# Patient Record
Sex: MALE | Race: WHITE | NOT HISPANIC OR LATINO | Employment: FULL TIME | RURAL
[De-identification: names, ages, dates, MRNs, and addresses within clinical notes are randomized per-mention and may not be internally consistent; named-entity substitution may affect disease eponyms.]

---

## 2017-02-06 ENCOUNTER — OFFICE VISIT (OUTPATIENT)
Dept: FAMILY MEDICINE CLINIC | Facility: CLINIC | Age: 42
End: 2017-02-06

## 2017-02-06 VITALS
HEART RATE: 84 BPM | RESPIRATION RATE: 16 BRPM | TEMPERATURE: 98.8 F | SYSTOLIC BLOOD PRESSURE: 152 MMHG | DIASTOLIC BLOOD PRESSURE: 90 MMHG | WEIGHT: 271 LBS | HEIGHT: 69 IN | BODY MASS INDEX: 40.14 KG/M2

## 2017-02-06 DIAGNOSIS — I10 ESSENTIAL HYPERTENSION: Primary | ICD-10-CM

## 2017-02-06 PROCEDURE — 99213 OFFICE O/P EST LOW 20 MIN: CPT | Performed by: NURSE PRACTITIONER

## 2017-02-06 RX ORDER — LOSARTAN POTASSIUM 100 MG/1
100 TABLET ORAL DAILY
Qty: 30 TABLET | Refills: 5 | Status: SHIPPED | OUTPATIENT
Start: 2017-02-06

## 2017-02-06 RX ORDER — LOSARTAN POTASSIUM 100 MG/1
100 TABLET ORAL DAILY
COMMUNITY
End: 2017-02-06 | Stop reason: SDUPTHER

## 2017-02-06 RX ORDER — AMLODIPINE BESYLATE 10 MG/1
10 TABLET ORAL DAILY
Qty: 30 TABLET | Refills: 5 | Status: SHIPPED | OUTPATIENT
Start: 2017-02-06 | End: 2017-07-06 | Stop reason: SDUPTHER

## 2017-02-06 NOTE — PROGRESS NOTES
Subjective   Alexis Mariee is a 41 y.o. male.     Hypertension   This is a chronic problem. The current episode started more than 1 year ago. The problem has been waxing and waning (b/p is running about 150/90 at home.) since onset. The problem is uncontrolled. Pertinent negatives include no anxiety, blurred vision, chest pain, headaches, malaise/fatigue, neck pain, orthopnea, palpitations, peripheral edema, PND, shortness of breath or sweats. Associated agents: does take caffiene pills and testosterone. Risk factors for coronary artery disease include male gender. Past treatments include ACE inhibitors. The current treatment provides moderate improvement. There are no compliance problems.  There is no history of angina, kidney disease, CAD/MI, CVA, heart failure, left ventricular hypertrophy, PVD or retinopathy.        The following portions of the patient's history were reviewed and updated as appropriate: allergies, current medications, past family history, past medical history, past social history, past surgical history and problem list.    Review of Systems   Constitutional: Negative.  Negative for malaise/fatigue.   HENT: Negative.    Eyes: Negative for blurred vision.   Respiratory: Negative.  Negative for shortness of breath.    Cardiovascular: Negative.  Negative for chest pain, palpitations, orthopnea and PND.   Musculoskeletal: Negative.  Negative for neck pain.   Skin: Negative.    Neurological: Negative.  Negative for headaches.   Psychiatric/Behavioral: Negative.        Objective   Physical Exam   Constitutional: He is oriented to person, place, and time. He appears well-developed and well-nourished. No distress.   HENT:   Head: Normocephalic.   Eyes: EOM are normal. Pupils are equal, round, and reactive to light.   Neck: Normal range of motion. Neck supple. No thyromegaly present.   Cardiovascular: Normal rate, regular rhythm and normal heart sounds.  Exam reveals no friction rub.    No murmur  heard.  Pulmonary/Chest: Effort normal and breath sounds normal. No respiratory distress. He has no wheezes. He has no rales.   Abdominal: Soft.   Musculoskeletal: Normal range of motion.   Neurological: He is alert and oriented to person, place, and time.   Skin: Skin is warm and dry.   Psychiatric: He has a normal mood and affect. Thought content normal.   Nursing note and vitals reviewed.      Assessment/Plan   Alexis was seen today for establish care.    Diagnoses and all orders for this visit:    Essential hypertension  -     amLODIPine (NORVASC) 10 MG tablet; Take 1 tablet by mouth Daily.  -     losartan (COZAAR) 100 MG tablet; Take 1 tablet by mouth Daily.  -     Comprehensive Metabolic Panel; Future  -     Lipid Panel; Future    he will cont with the losartan, but amlodipine is added.  He will cont to check his b/p at home.  Will have his labs drawn at an outside lab and will provide me with results.  Michael in 1 month.

## 2017-07-06 DIAGNOSIS — I10 ESSENTIAL HYPERTENSION: ICD-10-CM

## 2017-07-06 RX ORDER — AMLODIPINE BESYLATE 10 MG/1
TABLET ORAL
Qty: 30 TABLET | Refills: 0 | Status: SHIPPED | OUTPATIENT
Start: 2017-07-06

## 2019-03-05 ENCOUNTER — OFFICE VISIT (OUTPATIENT)
Dept: ORTHOPEDIC SURGERY | Facility: CLINIC | Age: 44
End: 2019-03-05

## 2019-03-05 VITALS — BODY MASS INDEX: 40.88 KG/M2 | WEIGHT: 276 LBS | HEIGHT: 69 IN

## 2019-03-05 DIAGNOSIS — M25.561 ACUTE PAIN OF RIGHT KNEE: Primary | ICD-10-CM

## 2019-03-05 DIAGNOSIS — S76.111A QUADRICEPS MUSCLE RUPTURE, RIGHT, INITIAL ENCOUNTER: ICD-10-CM

## 2019-03-05 PROCEDURE — 99203 OFFICE O/P NEW LOW 30 MIN: CPT | Performed by: ORTHOPAEDIC SURGERY

## 2019-03-05 RX ORDER — HYDROCODONE BITARTRATE AND ACETAMINOPHEN 7.5; 325 MG/1; MG/1
TABLET ORAL
Qty: 30 TABLET | Refills: 0 | Status: SHIPPED | OUTPATIENT
Start: 2019-03-05

## 2019-03-08 DIAGNOSIS — M25.561 ACUTE PAIN OF RIGHT KNEE: Primary | ICD-10-CM

## 2019-03-08 DIAGNOSIS — S76.111A QUADRICEPS MUSCLE RUPTURE, RIGHT, INITIAL ENCOUNTER: ICD-10-CM

## 2019-03-13 DIAGNOSIS — M25.561 ACUTE PAIN OF RIGHT KNEE: ICD-10-CM

## 2019-03-13 DIAGNOSIS — S76.111A QUADRICEPS MUSCLE RUPTURE, RIGHT, INITIAL ENCOUNTER: ICD-10-CM

## 2019-04-01 ENCOUNTER — HOSPITAL ENCOUNTER (OUTPATIENT)
Dept: PHYSICAL THERAPY | Facility: HOSPITAL | Age: 44
Setting detail: THERAPIES SERIES
Discharge: HOME | End: 2019-04-01

## 2019-04-01 ENCOUNTER — TRANSCRIBE ORDERS (OUTPATIENT)
Dept: PHYSICAL THERAPY | Facility: HOSPITAL | Age: 44
End: 2019-04-01

## 2019-04-01 DIAGNOSIS — S76.191D OTHER SPECIFIED INJURY OF RIGHT QUADRICEPS MUSCLE, FASCIA AND TENDON, SUBSEQUENT ENCOUNTER: Primary | ICD-10-CM

## 2019-04-01 DIAGNOSIS — M25.561 ACUTE PAIN OF RIGHT KNEE: ICD-10-CM

## 2019-04-01 PROCEDURE — 97110 THERAPEUTIC EXERCISES: CPT | Performed by: PHYSICAL THERAPIST

## 2019-04-01 PROCEDURE — 97161 PT EVAL LOW COMPLEX 20 MIN: CPT | Performed by: PHYSICAL THERAPIST

## 2019-04-01 NOTE — THERAPY EVALUATION
Outpatient Physical Therapy Ortho Initial Evaluation  [facility]  Shawna Hernández, PT, DPT, CSCS       Patient Name: Alexis Mariee  : 1975  MRN: [id number]  Today's Date: 2019      Visit Date: 2019     Pt reports 0/10 pain pre treatment, 0/10 pain post treatment  Reports N/A% of improvement.  Attended  visits.  Insurance available: medical necessity, 1 hour/day max  Next MD appt: ~3 weeks .  Recertification: 2019.    Patient Active Problem List   Diagnosis   • Essential hypertension   • Acute pain of right knee        Past Medical History:   Diagnosis Date   • Cellulitis and abscess of leg    • Cellulitis and abscess of trunk    • Cellulitis of trunk    • Deficiency of testosterone biosynthesis    • Encounter for general health examination    • Essential hypertension    • General medical exam    • Pain in lower limb    • Pain in upper limb         Past Surgical History:   Procedure Laterality Date   • BACK SURGERY  2017   • HIP SURGERY      fx left hip socket   • INJECTION OF MEDICATION  2012    Celestone (betamethasone)    • INJECTION OF MEDICATION  2012    Rocephin X 2   • ORIF FOREARM FRACTURE Left    • QUADRICEPS TENDON REPAIR Right 03/10/2019       Visit Dx:     ICD-10-CM ICD-9-CM   1. Other specified injury of right quadriceps muscle, fascia and tendon, subsequent encounter S76.191D 959.6     Number of days off work: Off work since February    Patient is .    Patient has 2 children.    Allergies       PenicillinsHives    Reviewed by You at 3:26 PM CDT     Medications      amLODIPine (NORVASC) 10 MG tablet     HYDROcodone-acetaminophen (NORCO) 7.5-325 MG per tablet     losartan (COZAAR) 100 MG tablet     Testosterone Cypionate 200 MG/ML kit          Patient History     Row Name 19 1500             History    Chief Complaint  Pain;Muscle weakness  -AJ      Type of Pain  Lower Extremity / Leg  -AJ      Date Current  Problem(s) Began  03/02/19  -AJ      Brief Description of Current Complaint  Patient reports he was in the garage and slipped on a yoga mat and tore his quad tendon.  -AJ      Previous treatment for THIS PROBLEM  Surgery  -AJ      Surgery Date:  03/12/19  -AJ      Patient/Caregiver Goals  Relieve pain;Return to prior level of function;Improve strength  -AJ      Current Tobacco Use  None  -AJ      Smoking Status  Non-smoker  -AJ      Patient's Rating of General Health  Good  -AJ      Occupation/sports/leisure activities  Occupation: [employer]; hobbies: [employer], weight lifting, off roading, cruising  -AJ      Patient seeing anyone else for problem(s)?  Yes, Ortho  -AJ      What clinical tests have you had for this problem?  MRI  -AJ      Results of Clinical Tests  Torn quad tendon  -AJ      History of Previous Related Injuries  None prior to this.  -AJ         Pain     Pain Location  Leg  -AJ      Pain at Present  0  -AJ      Pain at Best  0  -AJ      Pain at Worst  10  -AJ      Pain Frequency  Intermittent  -AJ      Pain Description  Dull;Aching;Sore  -AJ      Is your sleep disturbed?  Yes trouble getting to sleep  -AJ      Is medication used to assist with sleep?  Yes  -AJ      Difficulties at work?  Off work since february  -AJ      Difficulties with ADL's?  dressing, walking, transfers  -AJ      Difficulties with recreational activities?  all listed above  -AJ        User Key  (r) = Recorded By, (t) = Taken By, (c) = Cosigned By    Initials Name Provider Type    AJ Shawna Hernández, PT Physical Therapist          PT Ortho     Row Name 04/01/19 1500       Subjective Comments    Subjective Comments  Patient wishes to get back to weight lifting.  -AJ       Precautions and Contraindications    Contraindications  No active extension of R knee  (Significant)   -AJ       Subjective Pain    Able to rate subjective pain?  yes  -AJ    Pre-Treatment Pain Level  0  -AJ    Post-Treatment Pain Level  0  -AJ        Posture/Observations    Posture- WNL  Posture is WNL B LEs  -AJ    Observations  Incision healing;Muscle atrophy;Edema  -AJ    Posture/Observations Comments  Comes in WBAT earing locked hinged knee brace with ace wrap around leg. No disterss.  -AJ       Special Tests/Palpation    Special Tests/Palpation  -- No areas of TTP.  -AJ       Right Lower Ext    Rt Knee Extension/Flexion AROM  12°-65°  -AJ       Left Lower Ext    Lt Knee Extension/Flexion AROM  0°-127°  -AJ       MMT (Manual Muscle Testing)    General MMT Comments  Unable to SLR.  -AJ       Sensation    Sensation WNL?  WFL  -AJ    Light Touch  No apparent deficits  -AJ    Additional Comments  Denies any tingling.  -AJ       Lower Extremity Flexibility    Quadriceps  Right:;Severely limited  -AJ       RLE Quick Girth (cm)    Other 1  40 cm knee joint line  -AJ    Other 2  47.4 cm suprapatellar  -AJ       LLE Quick Girth (cm)    Other 1  40 cm knee joint line  -AJ    Other 2  51 cm suprapatellar  -AJ       Transfers    Comment (Transfers)  I with all transfers with brace and use of UEs to A R LE.  -AJ       Gait/Stairs Assessment/Training    Deviations/Abnormal Patterns (Gait)  right sided deviations;antalgic;base of support, wide  -AJ    Comment (Gait/Stairs)  Ambulating with hinged knee brace on R LE, locked at 0°, wider SOLEDAD to accomidate locked knee brace.  -AJ      User Key  (r) = Recorded By, (t) = Taken By, (c) = Cosigned By    Initials Name Provider Type    AJ Shawna Hernández, PT Physical Therapist              Therapy Education  Given: HEP, Symptoms/condition management, Pain management, Fall prevention and home safety, Mobility training, Edema management, Bandaging/dressing change(POC)  Program: New  How Provided: Verbal, Demonstration, Written  Provided to: Patient  Level of Understanding: Verbalized, Demonstrated     PT OP Goals     Row Name 04/01/19 1500          PT Short Term Goals    STG Date to Achieve  04/22/19  -AJ     STG 1  I with  HEP and have additions/changes by next recertification.  -     STG 2  AROM R knee 5° from full extension.  -     STG 3  AROM R knee flexion >= 90° of flexion.  -     STG 4  Patient able to perform 20 SLR with brace with no signficant lag present.  -     STG 5  Patient able to verbalize and abide by MD restrictions of no active extension and brace wear.  -        Long Term Goals    LTG Date to Achieve  06/14/19  -     LTG 1  AROM R knee 0°.  -     LTG 2  AROM R knee flexion >= 125°.  -     LTG 3  B LE 5/5.  -     LTG 4  Circum measurements suprapatellar within 0.5cm of R=L.  -     LTG 5  Patient able to ambulate FWB, non-antalgic, 1/4 mile with no increase in pain.  -     LTG 6  Patient able to ambulate up/down 3 steps reciprocally x10 with no increase in pain.  -     LTG 7  Sit to/from stand x20, no UE A with = WB B LEs.  -     LTG 8  I with final HEP.  -     LTG 9  D/C with final HEP and free 30 day fitness formula [employer].  -        Time Calculation    PT Goal Re-Cert Due Date  04/22/19  -       User Key  (r) = Recorded By, (t) = Taken By, (c) = Cosigned By    Initials Name Provider Type    Shawna Mahan, PT Physical Therapist         Barriers to Rehab: None noted.    Safety Issues: None noted.      PT Assessment/Plan     Row Name 04/01/19 1500          PT Assessment    Functional Limitations  Impaired gait;Limitation in home management;Limitations in community activities;Performance in leisure activities;Performance in self-care ADL;Performance in work activities  -     Impairments  Balance;Endurance;Edema;Gait;Impaired muscle endurance;Impaired muscle length;Impaired muscle power;Joint mobility;Motor function;Muscle strength;Pain;Range of motion  -     Assessment Comments  Patient unable to SLR. Discussed using brace locked for ambulation and sleeping. Did well with HEP and given written copies.  -     Rehab Potential  Good  -     Patient/caregiver  "participated in establishment of treatment plan and goals  Yes  -AJ     Patient would benefit from skilled therapy intervention  Yes  -AJ        PT Plan    PT Frequency  2x/week  -AJ     Predicted Duration of Therapy Intervention (Therapy Eval)  8-10 weeks  -AJ     Planned CPT's?  PT EVAL LOW COMPLEXITY: 95395;PT RE-EVAL: 44762;PT THER PROC EA 15 MIN: 93660;PT THER ACT EA 15 MIN: 77872;PT MANUAL THERAPY EA 15 MIN: 66528;PT GAIT TRAINING EA 15 MIN: 61175;PT ELECTRICAL STIM UNATTEND: ;PT THER SUPP EA 15 MIN  -AJ     Physical Therapy Interventions (Optional Details)  balance training;gait training;gross motor skills;home exercise program;manual therapy techniques;modalities;patient/family education;ROM (Range of Motion);stair training;strengthening;stretching;transfer training  -AJ     PT Plan Comments  Add heel prop next session.  -AJ       User Key  (r) = Recorded By, (t) = Taken By, (c) = Cosigned By    Initials Name Provider Type    Shawna Mahan, PT Physical Therapist       Other therapeutic activities and/or exercises will be prescribed depending on the patients progress or lack there of.      Exercises     Row Name 04/01/19 1500             Subjective Comments    Subjective Comments  Patient wishes to get back to weight lifting.  -AJ         Subjective Pain    Able to rate subjective pain?  yes  -AJ      Pre-Treatment Pain Level  0  -AJ      Post-Treatment Pain Level  0  -AJ         Exercise 1    Exercise Name 1  Quad sets with towel roll  -AJ      Reps 1  20  -AJ      Time 1  5\" hold  -AJ      Additional Comments  submax  -AJ         Exercise 2    Exercise Name 2  Heel slides with strap  -AJ      Cueing 2  Verbal;Demo  -AJ      Reps 2  20  -AJ      Time 2  5\" holds EOR  -AJ      Additional Comments  PROM for extension  -AJ         Exercise 3    Exercise Name 3  SL hip abduction with brace on  -AJ      Sets 3  2  -AJ      Reps 3  10  -AJ      Time 3  5\" hold  -AJ        User Key  (r) = Recorded " By, (t) = Taken By, (c) = Cosigned By    Initials Name Provider Type    Shawna Mahan, PT Physical Therapist                        Outcome Measure Options: Lower Extremity Functional Scale (LEFS)  Lower Extremity Functional Index  Any of your usual work, housework or school activities: Extreme difficulty or unable to perform activity  Your usual hobbies, recreational or sporting activities: Extreme difficulty or unable to perform activity  Getting into or out of the bath: Moderate difficulty  Walking between rooms: Moderate difficulty  Putting on your shoes or socks: Extreme difficulty or unable to perform activity  Squatting: Extreme difficulty or unable to perform activity  Lifting an object, like a bag of groceries from the floor: Extreme difficulty or unable to perform activity  Performing light activities around your home: A little bit of difficulty  Performing heavy activities around your home: Extreme difficulty or unable to perform activity  Getting into or out of a car: Extreme difficulty or unable to perform activity  Walking 2 blocks: Extreme difficulty or unable to perform activity  Walking a mile: Extreme difficulty or unable to perform activity  Going up or down 10 stairs (about 1 flight of stairs): Extreme difficulty or unable to perform activity  Standing for 1 hour: Extreme difficulty or unable to perform activity  Sitting for 1 hour: Extreme difficulty or unable to perform activity  Running on even ground: Extreme difficulty or unable to perform activity  Running on uneven ground: Extreme difficulty or unable to perform activity  Making sharp turns while running fast: Extreme difficulty or unable to perform activity  Hopping: Extreme difficulty or unable to perform activity  Rolling over in bed: A little bit of difficulty  Total: 10      Time Calculation:     Start Time: 1515  Stop Time: 1601  Time Calculation (min): 46 min  Total Timed Code Minutes- PT: 23 minute(s)     Therapy Charges  for Today     Code Description Service Date Service Provider Modifiers Qty    57352373114 HC PT EVAL LOW COMPLEXITY 2 4/1/2019 Shawna Hernández, PT GP 1    86814688760 HC PT THER PROC EA 15 MIN 4/1/2019 Shawna Hernández, PT GP 2    79263470879 HC PT THER SUPP EA 15 MIN 4/1/2019 Shawna Hernández, PT GP 1          PT G-Codes  Outcome Measure Options: Lower Extremity Functional Scale (LEFS)  Total: 10         Shawna Hernández, PT, DPT, CSCS  4/1/2019

## 2019-04-03 ENCOUNTER — HOSPITAL ENCOUNTER (OUTPATIENT)
Dept: PHYSICAL THERAPY | Facility: HOSPITAL | Age: 44
Setting detail: THERAPIES SERIES
Discharge: HOME | End: 2019-04-03

## 2019-04-03 DIAGNOSIS — M25.561 ACUTE PAIN OF RIGHT KNEE: ICD-10-CM

## 2019-04-03 DIAGNOSIS — S76.191D OTHER SPECIFIED INJURY OF RIGHT QUADRICEPS MUSCLE, FASCIA AND TENDON, SUBSEQUENT ENCOUNTER: Primary | ICD-10-CM

## 2019-04-03 PROCEDURE — 97110 THERAPEUTIC EXERCISES: CPT

## 2019-04-03 NOTE — THERAPY TREATMENT NOTE
Outpatient Physical Therapy Ortho Treatment Note  [facility]  Emilee Regan PTA       Patient Name: Alexis Mariee  : 1975  MRN: [id number]  Today's Date: 4/3/2019      Visit Date: 2019     Visits: 2/2   Insurance Visits Approved: no precert, insurance limitations of 1 hour/day max  Recert Due: 2019  MD Appt: 3 weeks  Pain: pretreatment 0/10; post treatment 0/10  Improvement: pt is subjectively reporting 0% improvement since initial evaluation    Visit Dx:    ICD-10-CM ICD-9-CM   1. Other specified injury of right quadriceps muscle, fascia and tendon, subsequent encounter S76.191D 959.6   2. Acute pain of right knee M25.561 719.46       Patient Active Problem List   Diagnosis   • Essential hypertension   • Acute pain of right knee        Past Medical History:   Diagnosis Date   • Cellulitis and abscess of leg    • Cellulitis and abscess of trunk    • Cellulitis of trunk    • Deficiency of testosterone biosynthesis    • Encounter for general health examination    • Essential hypertension    • General medical exam    • Pain in lower limb    • Pain in upper limb         Past Surgical History:   Procedure Laterality Date   • BACK SURGERY  2017   • HIP SURGERY      fx left hip socket   • INJECTION OF MEDICATION  2012    Celestone (betamethasone)    • INJECTION OF MEDICATION  2012    Rocephin X 2   • ORIF FOREARM FRACTURE Left    • QUADRICEPS TENDON REPAIR Right 03/10/2019       PT Ortho     Row Name 19 1500       Subjective Comments    Subjective Comments  Patient wishes to get back to weight lifting.  -AJ       Precautions and Contraindications    Contraindications  No active extension of R knee  (Significant)   -AJ       Subjective Pain    Able to rate subjective pain?  yes  -AJ    Pre-Treatment Pain Level  0  -AJ    Post-Treatment Pain Level  0  -AJ       Posture/Observations    Posture- WNL  Posture is WNL B LEs  -AJ    Observations   Incision healing;Muscle atrophy;Edema  -    Posture/Observations Comments  Comes in WBAT earing locked hinged knee brace with ace wrap around leg. No disterss.  -AJ       Special Tests/Palpation    Special Tests/Palpation  -- No areas of TTP.  -AJ       Right Lower Ext    Rt Knee Extension/Flexion AROM  12°-65°  -AJ       Left Lower Ext    Lt Knee Extension/Flexion AROM  0°-127°  -       MMT (Manual Muscle Testing)    General MMT Comments  Unable to SLR.  -AJ       Sensation    Sensation WNL?  WFL  -AJ    Light Touch  No apparent deficits  -AJ    Additional Comments  Denies any tingling.  -       Lower Extremity Flexibility    Quadriceps  Right:;Severely limited  -AJ       RLE Quick Girth (cm)    Other 1  40 cm knee joint line  -AJ    Other 2  47.4 cm suprapatellar  -AJ       LLE Quick Girth (cm)    Other 1  40 cm knee joint line  -AJ    Other 2  51 cm suprapatellar  -AJ       Transfers    Comment (Transfers)  I with all transfers with brace and use of UEs to A R LE.  -       Gait/Stairs Assessment/Training    Deviations/Abnormal Patterns (Gait)  right sided deviations;antalgic;base of support, wide  -AJ    Comment (Gait/Stairs)  Ambulating with hinged knee brace on R LE, locked at 0°, wider SOLEDAD to accomidate locked knee brace.  -      User Key  (r) = Recorded By, (t) = Taken By, (c) = Cosigned By    Initials Name Provider Type     Shawna Hernández PT Physical Therapist                      PT Assessment/Plan     Row Name 04/03/19 1500          PT Assessment    Assessment Comments  patient able to demonstrate a SLR at end of treatment today with max effort. good effort with all of treatment. and gives good attention to restrictions  -        PT Plan    PT Frequency  2x/week  -     PT Plan Comments  next visit continue working on ROM and reassess knee extension.   -       User Key  (r) = Recorded By, (t) = Taken By, (c) = Cosigned By    Initials Name Provider Type     Emilee Regan PTA  Physical Therapy Assistant          Modalities     Row Name 04/03/19 1500             Ice    Patient reports will apply ice at home to involved area  Yes  -MH        User Key  (r) = Recorded By, (t) = Taken By, (c) = Cosigned By    Initials Name Provider Type    Emilee Mujica PTA Physical Therapy Assistant        Exercises     Row Name 04/03/19 1500             Subjective Comments    Subjective Comments  doing fine today. has questions about how far he can bend his knee   -MH         Subjective Pain    Able to rate subjective pain?  yes  -MH      Pre-Treatment Pain Level  0  -MH      Post-Treatment Pain Level  0  -MH         Exercise 1    Exercise Name 1  Heel Slides with Strap  -MH      Time 1  5 minutes for 5 sec hold each rep  -MH      Additional Comments  PROM for extension  -MH         Exercise 2    Exercise Name 2  Quad Sets with Towel Roll   -MH      Reps 2  20  -MH      Time 2  5 sec hold  -MH         Exercise 3    Exercise Name 3  SLR Fwd Flex clinician Assist  -MH      Reps 3  20  -MH      Additional Comments  5 sec hold  -MH         Exercise 4    Exercise Name 4  Heel Prop   -MH      Time 4  5 minutes  -MH         Exercise 5    Exercise Name 5  SLR Hip ADD & ABD  -MH      Reps 5  20  -MH         Exercise 6    Exercise Name 6  prone hip ext  -MH      Reps 6  20  -MH         Exercise 7    Exercise Name 7  PEAFs  -MH      Reps 7  20  -MH      Time 7  5 sec hold  -MH         Exercise 8    Exercise Name 8  SLR no brace/no assist  -MH      Sets 8  4  -MH      Reps 8  5  -MH         Exercise 9    Exercise Name 9  Isometric [place]  -MH      Reps 9  20  -MH      Time 9  5 sec hold  -MH        User Key  (r) = Recorded By, (t) = Taken By, (c) = Cosigned By    Initials Name Provider Type    Emilee Mujica PTA Physical Therapy Assistant                       PT OP Goals     Row Name 04/03/19 1500          PT Short Term Goals    STG Date to Achieve  04/22/19  -MH     STG 1  I with HEP and have  additions/changes by next recertification.  -     STG 1 Progress  Met  -     STG 2  AROM R knee 5° from full extension.  -     STG 2 Progress  Ongoing  -     STG 3  AROM R knee flexion >= 90° of flexion.  -     STG 3 Progress  Ongoing  -     STG 4  Patient able to perform 20 SLR with brace with no signficant lag present.  -     STG 4 Progress  Progressing  -     STG 5  Patient able to verbalize and abide by MD restrictions of no active extension and brace wear.  -     STG 5 Progress  Met;Ongoing  [facility]        Long Term Goals    LTG Date to Achieve  06/14/19  -     LTG 1  AROM R knee 0°.  -     LTG 1 Progress  Ongoing  -     LTG 2  AROM R knee flexion >= 125°.  -     LTG 2 Progress  Ongoing  -     LTG 3  B LE 5/5.  -     LTG 3 Progress  Ongoing  -     LTG 4  Circum measurements suprapatellar within 0.5cm of R=L.  -     LTG 4 Progress  Ongoing  -     LTG 5  Patient able to ambulate FWB, non-antalgic, 1/4 mile with no increase in pain.  -     LTG 5 Progress  Ongoing  -     LTG 6  Patient able to ambulate up/down 3 steps reciprocally x10 with no increase in pain.  -     LTG 6 Progress  Ongoing  -     LTG 7  Sit to/from stand x20, no UE A with = WB B LEs.  -     LTG 7 Progress  Ongoing  [facility]     LTG 8  I with final HEP.  -     LTG 8 Progress  Ongoing  [facility]     LTG 9  D/C with final HEP and free [facility].  -     LTG 9 Progress  Ongoing  [facility]        Time Calculation    PT Goal Re-Cert Due Date  04/22/19  -       User Key  (r) = Recorded By, (t) = Taken By, (c) = Cosigned By    Initials Name Provider Type    Emilee Mujica PTA Physical Therapy Assistant          Therapy Education  Education Details: SLR 4 way, PEAFS, Heel Prop  Given: HEP, Symptoms/condition management, Pain management  Program: New, Reinforced  How Provided: Verbal, Demonstration, Written  Provided to: Patient  Level of Understanding: Teach back education performed, Verbalized,  Demonstrated              Time Calculation:   Start Time: 1520  Stop Time: 1635  Time Calculation (min): 75 min  PT Non-Billable Time (min): 15 min  Total Timed Code Minutes- PT: 60 minute(s)  Therapy Charges for Today     Code Description Service Date Service Provider Modifiers Qty    44044906905 HC PT THER PROC EA 15 MIN 4/3/2019 Emilee Regan PTA GP 4    26367422753 HC PT THER SUPP EA 15 MIN 4/3/2019 Emilee Regan PTA GP 2                    Emilee Regan PTA  4/3/2019

## 2019-04-09 ENCOUNTER — HOSPITAL ENCOUNTER (OUTPATIENT)
Dept: PHYSICAL THERAPY | Facility: HOSPITAL | Age: 44
Setting detail: THERAPIES SERIES
Discharge: HOME | End: 2019-04-09

## 2019-04-09 DIAGNOSIS — M25.561 ACUTE PAIN OF RIGHT KNEE: ICD-10-CM

## 2019-04-09 DIAGNOSIS — S76.191D OTHER SPECIFIED INJURY OF RIGHT QUADRICEPS MUSCLE, FASCIA AND TENDON, SUBSEQUENT ENCOUNTER: Primary | ICD-10-CM

## 2019-04-09 PROCEDURE — 97110 THERAPEUTIC EXERCISES: CPT

## 2019-04-09 NOTE — THERAPY TREATMENT NOTE
Outpatient Physical Therapy Ortho Treatment Note  [facility]  Emilee Regan PTA       Patient Name: Alexis Mariee  : 1975  MRN: [id number]  Today's Date: 2019      Visit Date: 2019     Visits: 3/3  Insurance Visits Approved: insurance limitations of 1 hour per day  Recert Due: 2019  MD Appt: 3 weeks  Pain: pretreatment 0/10; post treatment 0/10  Improvement: pt is subjectively reporting 0% improvement since initial evaluation    Visit Dx:    ICD-10-CM ICD-9-CM   1. Other specified injury of right quadriceps muscle, fascia and tendon, subsequent encounter S76.191D 959.6   2. Acute pain of right knee M25.561 719.46       Patient Active Problem List   Diagnosis   • Essential hypertension   • Acute pain of right knee        Past Medical History:   Diagnosis Date   • Cellulitis and abscess of leg    • Cellulitis and abscess of trunk    • Cellulitis of trunk    • Deficiency of testosterone biosynthesis    • Encounter for general health examination    • Essential hypertension    • General medical exam    • Pain in lower limb    • Pain in upper limb         Past Surgical History:   Procedure Laterality Date   • BACK SURGERY  2017   • HIP SURGERY      fx left hip socket   • INJECTION OF MEDICATION  2012    Celestone (betamethasone)    • INJECTION OF MEDICATION  2012    Rocephin X 2   • ORIF FOREARM FRACTURE Left    • QUADRICEPS TENDON REPAIR Right 03/10/2019       PT Ortho     Row Name 19 1300       Subjective Comments    Subjective Comments  denies pain. doing well. wonders when he will be allowed to do more.   -       Precautions and Contraindications    Contraindications  No active extension of R knee  (Significant)   -       Subjective Pain    Able to rate subjective pain?  yes  -    Pre-Treatment Pain Level  0  -MH    Post-Treatment Pain Level  0  -MH       Right Lower Ext    Rt Knee Extension/Flexion AROM  0°-82°  -      User Key   (r) = Recorded By, (t) = Taken By, (c) = Cosigned By    Initials Name Provider Type     Emilee Regan PTA Physical Therapy Assistant                      PT Assessment/Plan     Row Name 04/09/19 1300          PT Assessment    Assessment Comments  able to SLR but has poor endurance and only able to do sets of 5 before resting. does have minimal lag with SLR. has improved ROM of knee at this time  -        PT Plan    PT Frequency  2x/week  -     PT Plan Comments  next visit continue working on PEAFS and improving SLR  -       User Key  (r) = Recorded By, (t) = Taken By, (c) = Cosigned By    Initials Name Provider Type     Emilee Regan PTA Physical Therapy Assistant          Modalities     Row Name 04/09/19 1300             Ice    Ice Applied  Yes  -      Location  right knee  -      Rx Minutes  15 mins  -      Ice S/P Rx  Yes  -        User Key  (r) = Recorded By, (t) = Taken By, (c) = Cosigned By    Initials Name Provider Type     Emilee Regan PTA Physical Therapy Assistant        Exercises     Row Name 04/09/19 1300             Subjective Comments    Subjective Comments  denies pain. doing well. wonders when he will be allowed to do more.   -         Subjective Pain    Able to rate subjective pain?  yes  -      Pre-Treatment Pain Level  0  -      Post-Treatment Pain Level  0  -         Exercise 1    Exercise Name 1  Pro II backwards only  -      Time 1  10 minutes  -      Additional Comments  L 1.0  -         Exercise 2    Exercise Name 2  SLR Fwd Flexion  -      Sets 2  4  -      Reps 2  5  -      Additional Comments  no hold  -         Exercise 3    Exercise Name 3  Heel slide with strap   -      Time 3  5 minutes for 5 sec hold  -      Additional Comments  PROM for extension  -         Exercise 4    Exercise Name 4  quad sets  -      Time 4  5 minutes, 5 sec hold  -         Exercise 5    Exercise Name 5  Heel Prop  -      Time 5  5 minutes  -          Exercise 6    Exercise Name 6  SLR Hip ADD, Hip ABD  -      Reps 6  20  -         Exercise 7    Exercise Name 7  PEAF's  -      Reps 7  10  -        User Key  (r) = Recorded By, (t) = Taken By, (c) = Cosigned By    Initials Name Provider Type    Emilee Mujica, PTA Physical Therapy Assistant                       PT OP Goals     Row Name 04/09/19 1300          PT Short Term Goals    STG Date to Achieve  04/22/19  -     STG 1  I with HEP and have additions/changes by next recertification.  -     STG 1 Progress  Met  -     STG 2  AROM R knee 5° from full extension.  -     STG 2 Progress  Met  -     STG 3  AROM R knee flexion >= 90° of flexion.  -     STG 3 Progress  Ongoing  -     STG 4  Patient able to perform 20 SLR with brace with no signficant lag present.  -     STG 4 Progress  Progressing  -     STG 5  Patient able to verbalize and abide by MD restrictions of no active extension and brace wear.  -     STG 5 Progress  Met;Ongoing  -        Long Term Goals    LTG Date to Achieve  06/14/19  -     LTG 1  AROM R knee 0°.  -     LTG 1 Progress  Met  -     LTG 2  AROM R knee flexion >= 125°.  -     LTG 2 Progress  Ongoing  -     LTG 3  B LE 5/5.  -     LTG 3 Progress  Ongoing  -     LTG 4  Circum measurements suprapatellar within 0.5cm of R=L.  -     LTG 4 Progress  Ongoing  -     LTG 5  Patient able to ambulate FWB, non-antalgic, 1/4 mile with no increase in pain.  -     LTG 5 Progress  Ongoing  -     LTG 6  Patient able to ambulate up/down 3 steps reciprocally x10 with no increase in pain.  -     LTG 6 Progress  Ongoing  -     LTG 7  Sit to/from stand x20, no UE A with = WB B LEs.  -     LTG 7 Progress  Ongoing  -     LTG 8  I with final HEP.  -     LTG 8 Progress  Ongoing  [facility] [place]     LTG 9  D/C with final HEP and free 30 day fitness formula memembership.  -     LTG 9 Progress  Ongoing  -        Time Calculation    PT Goal Re-Cert Due Date  04/22/19   -       User Key  (r) = Recorded By, (t) = Taken By, (c) = Cosigned By    Initials Name Provider Type     Emilee Regan PTA Physical Therapy Assistant                         Time Calculation:   Start Time: 1300  Stop Time: 1405  Time Calculation (min): 65 min  PT Non-Billable Time (min): 15 min  Total Timed Code Minutes- PT: 50 minute(s)  Therapy Charges for Today     Code Description Service Date Service Provider Modifiers Qty    16258958629 HC PT THER PROC EA 15 MIN 4/9/2019 Emilee Regan PTA GP 3    77322132556 HC PT THER SUPP EA 15 MIN 4/9/2019 Emilee Regan PTA GP 1                    Emilee Regan PTA  4/9/2019

## 2019-04-11 ENCOUNTER — HOSPITAL ENCOUNTER (OUTPATIENT)
Dept: PHYSICAL THERAPY | Facility: HOSPITAL | Age: 44
Setting detail: THERAPIES SERIES
Discharge: HOME | End: 2019-04-11

## 2019-04-11 DIAGNOSIS — S76.191D OTHER SPECIFIED INJURY OF RIGHT QUADRICEPS MUSCLE, FASCIA AND TENDON, SUBSEQUENT ENCOUNTER: Primary | ICD-10-CM

## 2019-04-11 DIAGNOSIS — M25.561 ACUTE PAIN OF RIGHT KNEE: ICD-10-CM

## 2019-04-11 PROCEDURE — 97110 THERAPEUTIC EXERCISES: CPT

## 2019-04-11 NOTE — THERAPY TREATMENT NOTE
Outpatient Physical Therapy Ortho Treatment Note  [facility]  Emilee Regan PTA       Patient Name: Alexis Mariee  : 1975  MRN: [id number]  Today's Date: 2019      Visit Date: 2019     Visits: 3/3  Insurance Visits Approved: insurance limitations to 1 hour per day  Recert Due: 2019  MD Appt: 19  Pain: pretreatment 0/10; post treatment 0/10  Improvement: pt is subjectively reporting 0% improvement since initial evaluation    Visit Dx:    ICD-10-CM ICD-9-CM   1. Other specified injury of right quadriceps muscle, fascia and tendon, subsequent encounter S76.191D 959.6   2. Acute pain of right knee M25.561 719.46       Patient Active Problem List   Diagnosis   • Essential hypertension   • Acute pain of right knee        Past Medical History:   Diagnosis Date   • Cellulitis and abscess of leg    • Cellulitis and abscess of trunk    • Cellulitis of trunk    • Deficiency of testosterone biosynthesis    • Encounter for general health examination    • Essential hypertension    • General medical exam    • Pain in lower limb    • Pain in upper limb         Past Surgical History:   Procedure Laterality Date   • BACK SURGERY  2017   • HIP SURGERY      fx left hip socket   • INJECTION OF MEDICATION  2012    Celestone (betamethasone)    • INJECTION OF MEDICATION  2012    Rocephin X 2   • ORIF FOREARM FRACTURE Left    • QUADRICEPS TENDON REPAIR Right 03/10/2019       PT Ortho     Row Name 19 1000       Subjective Comments    Subjective Comments  states that he has been trying to elevate and ice his knee more. trying to stay off of it as much as he has been. feels like the swelling is down some now.   -       Precautions and Contraindications    Contraindications  No active extension of R knee  (Significant)   -       Subjective Pain    Able to rate subjective pain?  yes  -MH       Right Lower Ext    Rt Knee Extension/Flexion AROM  2°-0°--90°   -    Row Name 04/09/19 1300       Subjective Comments    Subjective Comments  denies pain. doing well. wonders when he will be allowed to do more.   -       Precautions and Contraindications    Contraindications  No active extension of R knee  (Significant)   -       Subjective Pain    Able to rate subjective pain?  yes  -    Pre-Treatment Pain Level  0  -    Post-Treatment Pain Level  0  -       Right Lower Ext    Rt Knee Extension/Flexion AROM  0°-82°  -      User Key  (r) = Recorded By, (t) = Taken By, (c) = Cosigned By    Initials Name Provider Type     Emilee Regan PTA Physical Therapy Assistant                      PT Assessment/Plan     Row Name 04/11/19 1000          PT Assessment    Assessment Comments  continues to progress SLR reps before need for rest break. good effort throughout. patient did well and is very motivated.   -        PT Plan    PT Frequency  2x/week  -     PT Plan Comments  continue to progress SLR as able. Bridges with SLR next visit  -       User Key  (r) = Recorded By, (t) = Taken By, (c) = Cosigned By    Initials Name Provider Type     Emilee Regan PTA Physical Therapy Assistant          Modalities     Row Name 04/11/19 1000             Ice    Patient reports will apply ice at home to involved area  Yes  -        User Key  (r) = Recorded By, (t) = Taken By, (c) = Cosigned By    Initials Name Provider Type     Emilee Regan PTA Physical Therapy Assistant        Exercises     Row Name 04/11/19 1000             Subjective Comments    Subjective Comments  states that he has been trying to elevate and ice his knee more. trying to stay off of it as much as he has been. feels like the swelling is down some now.   -         Subjective Pain    Able to rate subjective pain?  yes  -      Pre-Treatment Pain Level  0  -      Post-Treatment Pain Level  0  -         Exercise 1    Exercise Name 1  Pro II Backwards Only  -      Time 1  10 minutes  -       Additional Comments  L 1.0  -         Exercise 2    Exercise Name 2  Heel Slides with Strap  -      Time 2  5 minutes for 5 sec hold each rep  -      Additional Comments  PROM for extension  -         Exercise 3    Exercise Name 3  Quad Sets  -      Time 3  5 minutes for 5 sec hold each repeitition  -         Exercise 4    Exercise Name 4  Ankle 4 way Tband  -MH      Reps 4  20  -MH      Additional Comments  Grey Tband  -         Exercise 5    Exercise Name 5  SLR fwd flexion  -      Sets 5  4  -MH      Reps 5  7 reps  -      Additional Comments  no hold  -         Exercise 6    Exercise Name 6  SLR Abd, ADD, Ext  -      Reps 6  20  -MH      Time 6  5 sec hold  -         Exercise 7    Exercise Name 7  Prone Hamcurls  -      Reps 7  20  -MH         Exercise 8    Exercise Name 8  Bridges  -      Reps 8  20  -MH      Time 8  5 sec hold  -         Exercise 9    Exercise Name 9  PEAF's  -      Reps 9  10  -         Exercise 10    Exercise Name 10  Heel Prop  -      Time 10  5 minutes  -        User Key  (r) = Recorded By, (t) = Taken By, (c) = Cosigned By    Initials Name Provider Type    Emilee Mujica, PTA Physical Therapy Assistant                       PT OP Goals     Row Name 04/11/19 1000          PT Short Term Goals    STG Date to Achieve  04/22/19  -     STG 1  I with HEP and have additions/changes by next recertification.  -     STG 1 Progress  Met  -     STG 2  AROM R knee 5° from full extension.  -     STG 2 Progress  Met  -     STG 3  AROM R knee flexion >= 90° of flexion.  -     STG 3 Progress  Met  -     STG 4  Patient able to perform 20 SLR with brace with no signficant lag present.  -     STG 4 Progress  Progressing  -     STG 5  Patient able to verbalize and abide by MD restrictions of no active extension and brace wear.  -     STG 5 Progress  Met;Ongoing  -        Long Term Goals    LTG Date to Achieve  06/14/19  -     LTG 1  AROM R knee  0°.  -     LTG 1 Progress  Met  -     LTG 2  AROM R knee flexion >= 125°.  -     LTG 2 Progress  Ongoing  -     LTG 3  B LE 5/5.  -     LTG 3 Progress  Ongoing  -     LTG 4  Circum measurements suprapatellar within 0.5cm of R=L.  -     LTG 4 Progress  Ongoing  -     LTG 5  Patient able to ambulate FWB, non-antalgic, 1/4 mile with no increase in pain.  -     LTG 5 Progress  Ongoing  -     LTG 6  Patient able to ambulate up/down 3 steps reciprocally x10 with no increase in pain.  -     LTG 6 Progress  Ongoing  -     LTG 7  Sit to/from stand x20, no UE A with = WB B LEs.  -     LTG 7 Progress  Ongoing  -     LTG 8  I with final HEP.  -     LTG 8 Progress  Ongoing  -     LTG 9  D/C with final HEP and free 30 day fitness formula memembership.  -     LTG 9 Progress  Ongoing  -        Time Calculation    PT Goal Re-Cert Due Date  04/22/19  -       User Key  (r) = Recorded By, (t) = Taken By, (c) = Cosigned By    Initials Name Provider Type     Emilee Regan PTA Physical Therapy Assistant          Therapy Education  Education Details: prone hamcurls, bridges, ankle 4 way tband  Given: HEP, Symptoms/condition management, Pain management  Program: New, Reinforced  How Provided: Verbal, Demonstration, Written  Provided to: Patient  Level of Understanding: Teach back education performed, Verbalized, Demonstrated              Time Calculation:   Start Time: 1015  Stop Time: 1115  Time Calculation (min): 60 min  Total Timed Code Minutes- PT: 60 minute(s)  Therapy Charges for Today     Code Description Service Date Service Provider Modifiers Qty    20108091206 HC PT THER PROC EA 15 MIN 4/11/2019 Emilee Regan PTA GP 4    25844043142 HC PT THER SUPP EA 15 MIN 4/11/2019 Emilee Regan PTA GP 1                    Emilee Regan PTA  4/11/2019

## 2019-04-16 ENCOUNTER — HOSPITAL ENCOUNTER (OUTPATIENT)
Dept: PHYSICAL THERAPY | Facility: HOSPITAL | Age: 44
Setting detail: THERAPIES SERIES
Discharge: HOME | End: 2019-04-16

## 2019-04-16 DIAGNOSIS — M25.561 ACUTE PAIN OF RIGHT KNEE: ICD-10-CM

## 2019-04-16 DIAGNOSIS — S76.191D OTHER SPECIFIED INJURY OF RIGHT QUADRICEPS MUSCLE, FASCIA AND TENDON, SUBSEQUENT ENCOUNTER: Primary | ICD-10-CM

## 2019-04-16 PROCEDURE — 97110 THERAPEUTIC EXERCISES: CPT

## 2019-04-16 NOTE — THERAPY TREATMENT NOTE
Outpatient Physical Therapy Ortho Treatment Note  [facility]  Emilee Regan PTA       Patient Name: Alexis Mariee  : 1975  MRN: [id number]  Today's Date: 2019      Visit Date: 2019     Visits: 5/  Insurance Visits Approved: insurance limitations to 1 hour per day  Recert Due: 2019  MD Appt: 2019  Pain: pretreatment 0/10; post treatment 0/10  Improvement: pt is subjectively reporting does not rate% improvement since initial evaluation    Visit Dx:    ICD-10-CM ICD-9-CM   1. Other specified injury of right quadriceps muscle, fascia and tendon, subsequent encounter S76.191D 959.6   2. Acute pain of right knee M25.561 719.46       Patient Active Problem List   Diagnosis   • Essential hypertension   • Acute pain of right knee        Past Medical History:   Diagnosis Date   • Cellulitis and abscess of leg    • Cellulitis and abscess of trunk    • Cellulitis of trunk    • Deficiency of testosterone biosynthesis    • Encounter for general health examination    • Essential hypertension    • General medical exam    • Pain in lower limb    • Pain in upper limb         Past Surgical History:   Procedure Laterality Date   • BACK SURGERY  2017   • HIP SURGERY      fx left hip socket   • INJECTION OF MEDICATION  2012    Celestone (betamethasone)    • INJECTION OF MEDICATION  2012    Rocephin X 2   • ORIF FOREARM FRACTURE Left    • QUADRICEPS TENDON REPAIR Right 03/10/2019       PT Ortho     Row Name 19 1300       Subjective Comments    Subjective Comments  doing well. no pain. on  he rode the Pro II Bike for an hour, reports he tlaked tot he primary PT about it yesterday and she urged him to stick with more like 30 minutes and to not overdo things  -       Precautions and Contraindications    Contraindications  No active extension of R knee  (Significant)   -       Subjective Pain    Able to rate subjective pain?  yes  -     Pre-Treatment Pain Level  0  -MH    Post-Treatment Pain Level  0  -MH       Right Lower Ext    Rt Knee Extension/Flexion AROM  3°-0-100°  -       Girth    Girth Measured?  -- 6 inches above joint line R quad 54 cm; L quad 61 cm  -       RLE Quick Girth (cm)    Other 1  38.7 cm knee joint line  -MH    Other 2  44.4 cm suprapatella  -MH       LLE Quick Girth (cm)    Other 1  40 cm knee joint line  -MH    Other 2  47.5 cm suprapatellar  -      User Key  (r) = Recorded By, (t) = Taken By, (c) = Cosigned By    Initials Name Provider Type     Emilee Regan PTA Physical Therapy Assistant                      PT Assessment/Plan     Row Name 04/16/19 1300          PT Assessment    Assessment Comments  patient continues to demonstrate increased AROM. good effort throughout treatment. patient less hesitant with PEAFS this treatment  -        PT Plan    PT Frequency  2x/week  -     PT Plan Comments  contiue to progress as allowed   -       User Key  (r) = Recorded By, (t) = Taken By, (c) = Cosigned By    Initials Name Provider Type     Emilee Regan, JOCELYNN Physical Therapy Assistant          Modalities     Row Name 04/16/19 1300             Ice    Ice Applied  Yes  -      Location  right knee  -      Rx Minutes  15 mins  -      Ice S/P Rx  Yes  -        User Key  (r) = Recorded By, (t) = Taken By, (c) = Cosigned By    Initials Name Provider Type     Emilee Regan PTA Physical Therapy Assistant        Exercises     Row Name 04/16/19 1300             Subjective Comments    Subjective Comments  doing well. no pain. on sunday he rode the Pro II Bike for an hour, reports he tlaked tot he primary PT about it yesterday and she urged him to stick with more like 30 minutes and to not overdo things  -         Subjective Pain    Able to rate subjective pain?  yes  -MH      Pre-Treatment Pain Level  0  -MH      Post-Treatment Pain Level  0  -MH         Exercise 1    Exercise Name 1  Pro II Backwards only  -       Time 1  10 minutes  -      Additional Comments  L 4.0  -         Exercise 2    Exercise Name 2  Bridges with B SLR  -      Sets 2  2  -MH      Reps 2  10 each  -         Exercise 3    Exercise Name 3  SLR Hip ADD; SLR Hip ABD  -      Reps 3  20 each  -         Exercise 4    Exercise Name 4  Prone Hamcurls  -      Reps 4  20  -MH         Exercise 5    Exercise Name 5  Heel Slides with Strap  -      Time 5  5 minutes  -         Exercise 6    Exercise Name 6  Heel Prop  -      Time 6  5 minutes  -         Exercise 7    Exercise Name 7  Isometric Hamsets  -      Reps 7  20  -MH      Time 7  5 sec hold  -MH         Exercise 8    Exercise Name 8  PEAFS  -MH      Reps 8  20  -MH      Time 8  5 sec hold  -         Exercise 9    Exercise Name 9  [place]  -      Time 9  4 minutes  -      Additional Comments  dig with HS;   -        User Key  (r) = Recorded By, (t) = Taken By, (c) = Cosigned By    Initials Name Provider Type    Emilee Mujica, PTA Physical Therapy Assistant                       PT OP Goals     Row Name 04/16/19 1300          PT Short Term Goals    STG Date to Achieve  04/22/19  -     STG 1  I with HEP and have additions/changes by next recertification.  -     STG 1 Progress  Met  -     STG 2  AROM R knee 5° from full extension.  -     STG 2 Progress  Met  -     STG 3  AROM R knee flexion >= 90° of flexion.  -     STG 3 Progress  Met  -     STG 4  Patient able to perform 20 SLR with brace with no signficant lag present.  -     STG 4 Progress  Met  -     STG 5  Patient able to verbalize and abide by MD restrictions of no active extension and brace wear.  -     STG 5 Progress  Met;Ongoing  -        Long Term Goals    LTG Date to Achieve  06/14/19  -     LTG 1  AROM R knee 0°.  -     LTG 1 Progress  Met  -     LTG 2  AROM R knee flexion >= 125°.  -     LTG 2 Progress  Ongoing  -     LTG 3  B LE 5/5.  -     LTG 3 Progress  Ongoing  -      LTG 4  Circum measurements suprapatellar within 0.5cm of R=L.  -     LTG 4 Progress  Ongoing  -     LTG 5  Patient able to ambulate FWB, non-antalgic, 1/4 mile with no increase in pain.  -     LTG 5 Progress  Ongoing  -     LTG 6  Patient able to ambulate up/down 3 steps reciprocally x10 with no increase in pain.  -     LTG 6 Progress  Ongoing  -     LTG 7  Sit to/from stand x20, no UE A with = WB B LEs.  -     LTG 7 Progress  Ongoing  -     LTG 8  I with final HEP.  -     LTG 8 Progress  Ongoing  -     LTG 9  D/C with final HEP and free 30 day fitness formula memembership.  -     LTG 9 Progress  Ongoing  -        Time Calculation    PT Goal Re-Cert Due Date  04/22/19  -       User Key  (r) = Recorded By, (t) = Taken By, (c) = Cosigned By    Initials Name Provider Type     Emilee Regan PTA Physical Therapy Assistant                         Time Calculation:   Start Time: 1300  Stop Time: 1420  Time Calculation (min): 80 min  PT Non-Billable Time (min): 15 min  Total Timed Code Minutes- PT: 65 minute(s)  Therapy Charges for Today     Code Description Service Date Service Provider Modifiers Qty    76705050893 HC PT THER SUPP EA 15 MIN 4/16/2019 Emilee Regan PTA GP 1    87515384438 HC PT THER PROC EA 15 MIN 4/16/2019 Emilee Regan PTA  4                    Emilee Regan PTA  4/16/2019

## 2019-04-18 ENCOUNTER — HOSPITAL ENCOUNTER (OUTPATIENT)
Dept: PHYSICAL THERAPY | Facility: HOSPITAL | Age: 44
Setting detail: THERAPIES SERIES
Discharge: HOME | End: 2019-04-18

## 2019-04-18 DIAGNOSIS — S76.191D OTHER SPECIFIED INJURY OF RIGHT QUADRICEPS MUSCLE, FASCIA AND TENDON, SUBSEQUENT ENCOUNTER: Primary | ICD-10-CM

## 2019-04-18 DIAGNOSIS — M25.561 ACUTE PAIN OF RIGHT KNEE: ICD-10-CM

## 2019-04-18 PROCEDURE — 97110 THERAPEUTIC EXERCISES: CPT

## 2019-04-18 NOTE — THERAPY TREATMENT NOTE
"    Outpatient Physical Therapy Ortho Treatment Note  [facility]  Emilee Regan, JOCELYNN       Patient Name: Alexis Mariee  : 1975  MRN: [id number]  Today's Date: 2019      Visit Date: 2019     Visits: 6/6  Insurance Visits Approved: insurance limitations to 1 hour per day  Recert Due: 2019  MD Appt: 2019  Pain: pretreatment 0/10; post treatment 0/10  Improvement: pt is subjectively reporting \"i'm not sure\" then states \"I'd say its a solid 75%\" improvement since initial evaluation    Visit Dx:    ICD-10-CM ICD-9-CM   1. Other specified injury of right quadriceps muscle, fascia and tendon, subsequent encounter S76.191D 959.6   2. Acute pain of right knee M25.561 719.46       Patient Active Problem List   Diagnosis   • Essential hypertension   • Acute pain of right knee        Past Medical History:   Diagnosis Date   • Cellulitis and abscess of leg    • Cellulitis and abscess of trunk    • Cellulitis of trunk    • Deficiency of testosterone biosynthesis    • Encounter for general health examination    • Essential hypertension    • General medical exam    • Pain in lower limb    • Pain in upper limb         Past Surgical History:   Procedure Laterality Date   • BACK SURGERY  2017   • HIP SURGERY      fx left hip socket   • INJECTION OF MEDICATION  2012    Celestone (betamethasone)    • INJECTION OF MEDICATION  2012    Rocephin X 2   • ORIF FOREARM FRACTURE Left    • QUADRICEPS TENDON REPAIR Right 03/10/2019       PT Ortho     Row Name 19 1300       Subjective Comments    Subjective Comments  comes to therapy area to ask clinician if he is able to do the seated hamcurl machine and be within his restrictions. verbalizes understanding not to overweight it. reports initially that he is not suer about improvement, then states \"solid 75%\"  -       Precautions and Contraindications    Contraindications  No active extension of R knee  " (Significant)   -       Subjective Pain    Able to rate subjective pain?  yes  -    Pre-Treatment Pain Level  0  -    Row Name 04/16/19 1300       Subjective Comments    Subjective Comments  doing well. no pain. on sunday he rode the Pro II Bike for an hour, reports he tlaked tot he primary PT about it yesterday and she urged him to stick with more like 30 minutes and to not overdo things  -       Precautions and Contraindications    Contraindications  No active extension of R knee  (Significant)   -       Subjective Pain    Able to rate subjective pain?  yes  -    Pre-Treatment Pain Level  0  -    Post-Treatment Pain Level  0  -MH       Right Lower Ext    Rt Knee Extension/Flexion AROM  3°-0-100°  -       Girth    Girth Measured?  -- 6 inches above joint line R quad 54 cm; L quad 61 cm  -       RLE Quick Girth (cm)    Other 1  38.7 cm knee joint line  -MH    Other 2  44.4 cm suprapatella  -       LLE Quick Girth (cm)    Other 1  40 cm knee joint line  -MH    Other 2  47.5 cm suprapatellar  -      User Key  (r) = Recorded By, (t) = Taken By, (c) = Cosigned By    Initials Name Provider Type    Emilee Mujica PTA Physical Therapy Assistant                      PT Assessment/Plan     Row Name 04/18/19 1300          PT Assessment    Assessment Comments  patient doing well with being cautious iwth independent fitness. good effort throughout treatment.   -        PT Plan    PT Frequency  2x/week  -     PT Plan Comments  recheck and MD note next visit  -       User Key  (r) = Recorded By, (t) = Taken By, (c) = Cosigned By    Initials Name Provider Type    Emilee Mujica PTA Physical Therapy Assistant          Modalities     Row Name 04/18/19 1300             Ice    Ice Applied  Yes  -      Location  right knee  -      Rx Minutes  Other: 20  -MH      Ice S/P Rx  Yes  -        User Key  (r) = Recorded By, (t) = Taken By, (c) = Cosigned By    Initials Name Provider Type    NIKO Regan  "Emilee NAVARRO PTA Physical Therapy Assistant        Exercises     Row Name 04/18/19 1300             Subjective Comments    Subjective Comments  comes to therapy area to ask clinician if he is able to do the seated hamcurl machine and be within his restrictions. verbalizes understanding not to overweight it. reports initially that he is not suer about improvement, then states \"solid 75%\"  -         Subjective Pain    Able to rate subjective pain?  yes  -      Pre-Treatment Pain Level  0  -MH      Post-Treatment Pain Level  0  -MH         Exercise 1    Exercise Name 1  Pro II Backwards  -      Time 1  10 minutes  -      Additional Comments  L 4.0  -MH         Exercise 2    Exercise Name 2  Prone Bent Knee Heel Press Up  -MH      Reps 2  20  -MH         Exercise 3    Exercise Name 3  Prone Hamcurls  -MH      Reps 3  20  -MH      Additional Comments  Green Tband  -MH         Exercise 4    Exercise Name 4  Sidelying Hip ADD and ABD with tband resist  -MH      Reps 4  20 each  -MH      Additional Comments  Red Tband  -MH         Exercise 5    Exercise Name 5  SLR fwd flex  -MH      Reps 5  20  -MH      Additional Comments  red tband resist  -         Exercise 6    Exercise Name 6  Heel Prop  -      Time 6  5 minutes  -         Exercise 7    Exercise Name 7  PEAFS  -MH      Reps 7  20  -MH      Time 7  5 sec hold  -MH         Exercise 8    Exercise Name 8  [place]  -      Time 8  5 minutes  -MH      Additional Comments  dig with HS  -MH         Exercise 9    Exercise Name 9  Laura Disc cocontraction  -MH      Reps 9  20  -MH        User Key  (r) = Recorded By, (t) = Taken By, (c) = Cosigned By    Initials Name Provider Type     Emilee Regan PTA Physical Therapy Assistant                       PT OP Goals     Row Name 04/18/19 1300          PT Short Term Goals    STG Date to Achieve  04/22/19  -     STG 1  I with HEP and have additions/changes by next recertification.  -     STG 1 Progress  Met  -  "    STG 2  AROM R knee 5° from full extension.  -     STG 2 Progress  Met  -     STG 3  AROM R knee flexion >= 90° of flexion.  -     STG 3 Progress  Met  -     STG 4  Patient able to perform 20 SLR with brace with no signficant lag present.  -     STG 4 Progress  Met  -     STG 5  Patient able to verbalize and abide by MD restrictions of no active extension and brace wear.  -     STG 5 Progress  Met;Ongoing  [facility]        Long Term Goals    LTG Date to Achieve  06/14/19  -     LTG 1  AROM R knee 0°.  -     LTG 1 Progress  Met  -     LTG 2  AROM R knee flexion >= 125°.  -     LTG 2 Progress  Ongoing  -     LTG 3  B LE 5/5.  -     LTG 3 Progress  Ongoing  [facility]     LTG 4  Circum measurements suprapatellar within 0.5cm of R=L.  -     LTG 4 Progress  Ongoing  -     LTG 5  Patient able to ambulate FWB, non-antalgic, 1/4 mile with no increase in pain.  -     LTG 5 Progress  Ongoing  -     LTG 6  Patient able to ambulate up/down 3 steps reciprocally x10 with no increase in pain.  -     LTG 6 Progress  Ongoing  -     LTG 7  Sit to/from stand x20, no UE A with = WB B LEs.  -     LTG 7 Progress  Ongoing  [facility]     LTG 8  I with final HEP.  -     LTG 8 Progress  Ongoing  [facility]     LTG 9  D/C with final HEP and [facility]  -     LTG 9 Progress  Ongoing  [facility]        Time Calculation    PT Goal Re-Cert Due Date  04/22/19  -       User Key  (r) = Recorded By, (t) = Taken By, (c) = Cosigned By    Initials Name Provider Type    Emilee Mujica PTA Physical Therapy Assistant          Therapy Education  Education Details: red and green tband for HEP  Given: HEP, Symptoms/condition management, Pain management  Program: Reinforced  How Provided: Verbal, Demonstration  Provided to: Patient  Level of Understanding: Teach back education performed, Verbalized, Demonstrated              Time Calculation:   Start Time: 1300  Stop Time: 1419  Time Calculation (min): 79  min  PT Non-Billable Time (min): 20 min  Total Timed Code Minutes- PT: 59 minute(s)  Therapy Charges for Today     Code Description Service Date Service Provider Modifiers Qty    14857512398  PT THER PROC EA 15 MIN 4/18/2019 Emilee Regan PTA GP 4    [id number]  PT THER SUPP EA 15 MIN 4/18/2019 Emilee Regan PTA GP 1                    Emilee Regan PTA  4/18/2019

## 2019-04-22 ENCOUNTER — HOSPITAL ENCOUNTER (OUTPATIENT)
Dept: PHYSICAL THERAPY | Facility: HOSPITAL | Age: 44
Setting detail: THERAPIES SERIES
Discharge: HOME | End: 2019-04-22

## 2019-04-22 DIAGNOSIS — M25.561 ACUTE PAIN OF RIGHT KNEE: ICD-10-CM

## 2019-04-22 DIAGNOSIS — S76.191D OTHER SPECIFIED INJURY OF RIGHT QUADRICEPS MUSCLE, FASCIA AND TENDON, SUBSEQUENT ENCOUNTER: Primary | ICD-10-CM

## 2019-04-22 PROCEDURE — 97110 THERAPEUTIC EXERCISES: CPT | Performed by: PHYSICAL THERAPIST

## 2019-04-22 NOTE — THERAPY PROGRESS REPORT/RE-CERT
Outpatient Physical Therapy Ortho Progress Note  [facility]  Shawna Hernández, PT, DPT, CSCS       Patient Name: Alexis Mariee  : 1975  MRN: [id number]  Today's Date: 2019      Visit Date: 2019     Pt reports 0/10 pain pre treatment, 0/10 pain post treatment  Reports 75% of improvement.  Attended 7/7 visits.  Insurance available: insurance limitations 1 hour per day.  Next MD appt: 2019.  Recertification: 2019.    Visit Dx:    ICD-10-CM ICD-9-CM   1. Other specified injury of right quadriceps muscle, fascia and tendon, subsequent encounter S76.191D 959.6   2. Acute pain of right knee M25.561 719.46       Patient Active Problem List   Diagnosis   • Essential hypertension   • Acute pain of right knee        Past Medical History:   Diagnosis Date   • Cellulitis and abscess of leg    • Cellulitis and abscess of trunk    • Cellulitis of trunk    • Deficiency of testosterone biosynthesis    • Encounter for general health examination    • Essential hypertension    • General medical exam    • Pain in lower limb    • Pain in upper limb         Past Surgical History:   Procedure Laterality Date   • BACK SURGERY  2017   • HIP SURGERY      fx left hip socket   • INJECTION OF MEDICATION  2012    Celestone (betamethasone)    • INJECTION OF MEDICATION  2012    Rocephin X 2   • ORIF FOREARM FRACTURE Left    • QUADRICEPS TENDON REPAIR Right 03/10/2019     Number of days off work: Off since accident    Changes to medications: None noted.    Changes to MD orders: None noted.      PT Ortho     Row Name 19 0800       Subjective Comments    Subjective Comments  Patient reports he had trouble sleeping last night because of his knee.  -AJ       Precautions and Contraindications    Contraindications  No active extension of R knee  (Significant)   -AJ       Subjective Pain    Able to rate subjective pain?  yes  -AJ    Pre-Treatment Pain Level  0  -AJ  "   Post-Treatment Pain Level  0  -AJ       Posture/Observations    Posture- WNL  Posture is WNL for B LEs  -AJ    Observations  Edema;Muscle atrophy  -AJ    Posture/Observations Comments  Comes in WBAT earing locked hinged knee brace. No disterss.  -AJ       Special Tests/Palpation    Special Tests/Palpation  -- distal quad TTP.  -AJ       Right Lower Ext    Rt Knee Extension/Flexion AROM  2°-0°-108°  -AJ       MMT (Manual Muscle Testing)    General MMT Comments  No lag with SLR  -AJ       Sensation    Sensation WNL?  WFL  -AJ    Light Touch  No apparent deficits  -AJ    Additional Comments  Denies any tingling.  -AJ       RLE Quick Girth (cm)    Distal thigh  57.1 cm 6\" above joint line  -AJ    Proximal thigh  67 cm 12\" above joint line  -AJ    Other 1  42.3 cm KNee joint line  -AJ    Other 2  48.5 cm supra patellar  -AJ       LLE Quick Girth (cm)    Distal thigh  60.2 cm 6\" above joint line  -AJ    Proximal thigh  72 cm 12\" above joint line  -AJ    Other 1  40.8 cm knee joint line  -AJ    Other 2  49.3 cm supra patellar  -AJ       Transfers    Comment (Transfers)  I with all transfers with brace and use of UEs to A R LE.  -AJ       Gait/Stairs Assessment/Training    Deviations/Abnormal Patterns (Gait)  right sided deviations;antalgic;base of support, wide  -AJ    Comment (Gait/Stairs)  Ambulating with hinged knee brace on R LE, locked at 0°, wider SOLEDAD to accomidate locked knee brace.  -AJ      User Key  (r) = Recorded By, (t) = Taken By, (c) = Cosigned By    Initials Name Provider Type    AJ Shawna Hernández, PT Physical Therapist              Barriers to Rehab: None noted.     Safety Issues: None noted.        PT Assessment/Plan     Row Name 04/22/19 0800          PT Assessment    Functional Limitations  Impaired gait;Limitation in home management;Limitations in community activities;Performance in leisure activities;Performance in self-care ADL;Performance in work activities  -     Impairments  " Balance;Endurance;Edema;Gait;Impaired muscle endurance;Impaired muscle length;Impaired muscle power;Joint mobility;Motor function;Muscle strength;Pain;Range of motion  -AJ     Rehab Potential  Good  -AJ     Patient/caregiver participated in establishment of treatment plan and goals  Yes  -AJ     Patient would benefit from skilled therapy intervention  Yes  -AJ        PT Plan    PT Frequency  2x/week  -AJ     Predicted Duration of Therapy Intervention (Therapy Eval)  4-8 more weeks  -AJ     Planned CPT's?  PT RE-EVAL: 77702;PT THER PROC EA 15 MIN: 08945;PT THER ACT EA 15 MIN: 57675;PT MANUAL THERAPY EA 15 MIN: 28228;PT ELECTRICAL STIM UNATTEND: ;PT GAIT TRAINING EA 15 MIN: 90652;PT THER SUPP EA 15 MIN  -AJ     Physical Therapy Interventions (Optional Details)  balance training;gait training;gross motor skills;home exercise program;joint mobilization;manual therapy techniques;modalities;patient/family education;ROM (Range of Motion);stair training;strengthening;stretching  -AJ     PT Plan Comments  Progres per MD restrictions. MD note sent with patient.  -       User Key  (r) = Recorded By, (t) = Taken By, (c) = Cosigned By    Initials Name Provider Type    Shawna Mahan, PT Physical Therapist       Other therapeutic activities and/or exercises will be prescribed depending on the patients progress or lack there of.      Exercises     Row Name 04/22/19 0800             Subjective Comments    Subjective Comments  Patient reports he had trouble sleeping last night because of his knee.  -         Subjective Pain    Able to rate subjective pain?  yes  -AJ      Pre-Treatment Pain Level  0  -AJ      Post-Treatment Pain Level  0  -AJ         Exercise 1    Exercise Name 1  Pro II Backwards  -AJ      Time 1  10 minutes  -AJ      Additional Comments  L 4.0  -AJ         Exercise 2    Exercise Name 2  Prone Bent Knee Heel Press Up  -AJ      Reps 2  20  -AJ         Exercise 3    Exercise Name 3  Prone GTB  "Hamcurls  -      Reps 3  20  -         Exercise 4    Exercise Name 4  Heel prop  -      Time 4  5 minutes  -         Exercise 5    Exercise Name 5  Heel Slides with Strap  -      Time 5  5\" holds for 5 minutes  -         Exercise 6    Exercise Name 6  RTB SLR 4-way  -      Reps 6  20 each  -        User Key  (r) = Recorded By, (t) = Taken By, (c) = Cosigned By    Initials Name Provider Type    Shawna Mahan, PT Physical Therapist                       PT OP Goals     Row Name 04/22/19 0800          PT Short Term Goals    STG Date to Achieve  04/22/19  -     STG 1  I with HEP and have additions/changes by next recertification.  -     STG 1 Progress  Met  -     STG 2  AROM R knee 5° from full extension.  -     STG 2 Progress  Met  -     STG 3  AROM R knee flexion >= 90° of flexion.  -     STG 3 Progress  Met  -     STG 4  Patient able to perform 20 SLR with brace with no signficant lag present.  -     STG 4 Progress  Met  -     STG 5  Patient able to verbalize and abide by MD restrictions of no active extension and brace wear.  -     STG 5 Progress  Met;Ongoing  -        Long Term Goals    LTG Date to Achieve  06/14/19  -     LTG 1  AROM R knee 0°.  -     LTG 1 Progress  Met  -     LTG 2  AROM R knee flexion >= 125°.  -     LTG 2 Progress  Ongoing  -     LTG 3  B LE 5/5.  -     LTG 3 Progress  Ongoing  -     LTG 4  Circum measurements suprapatellar within 0.5cm of R=L.  -     LTG 4 Progress  Ongoing  -     LTG 5  Patient able to ambulate FWB, non-antalgic, 1/4 mile with no increase in pain.  -     LTG 5 Progress  Ongoing  -     LTG 6  Patient able to ambulate up/down 3 steps reciprocally x10 with no increase in pain.  -     LTG 6 Progress  Ongoing  -     LTG 7  Sit to/from stand x20, no UE A with = WB B LEs.  -     LTG 7 Progress  Ongoing  -     LTG 8  I with final HEP.  -     LTG 8 Progress  Ongoing  -     LTG 9  D/C with final HEP and " free 30 day fitness formula [facility].  -JANIE     LTG 9 Progress  Ongoing  -JANIE        Time Calculation    PT Goal Re-Cert Due Date  05/13/19  -JANIE       User Key  (r) = Recorded By, (t) = Taken By, (c) = Cosigned By    Initials Name Provider Type    Shawna Mahan, PT Physical Therapist          Therapy Education  Given: HEP, Symptoms/condition management, Pain management(POC)  Program: Reinforced  How Provided: Verbal, Demonstration  Provided to: Patient  Level of Understanding: Teach back education performed, Verbalized, Demonstrated    Outcome Measure Options: Lower Extremity Functional Scale (LEFS)  Lower Extremity Functional Index  Any of your usual work, housework or school activities: Extreme difficulty or unable to perform activity  Your usual hobbies, recreational or sporting activities: Moderate difficulty  Getting into or out of the bath: A little bit of difficulty  Walking between rooms: No difficulty  Putting on your shoes or socks: A little bit of difficulty  Squatting: Extreme difficulty or unable to perform activity  Lifting an object, like a bag of groceries from the floor: A little bit of difficulty  Performing light activities around your home: A little bit of difficulty  Performing heavy activities around your home: A little bit of difficulty  Getting into or out of a car: A little bit of difficulty  Walking 2 blocks: Quite a bit of difficulty  Walking a mile: Quite a bit of difficulty  Going up or down 10 stairs (about 1 flight of stairs): Moderate difficulty  Standing for 1 hour: No difficulty  Sitting for 1 hour: No difficulty  Running on even ground: Extreme difficulty or unable to perform activity  Running on uneven ground: Extreme difficulty or unable to perform activity  Making sharp turns while running fast: Extreme difficulty or unable to perform activity  Hopping: Extreme difficulty or unable to perform activity  Rolling over in bed: A little bit of difficulty  Total:  39      Time Calculation:   Start Time: 0759  Stop Time: 0907  Time Calculation (min): 68 min  PT Non-Billable Time (min): 15 min  Total Timed Code Minutes- PT: 53 minute(s)  Therapy Charges for Today     Code Description Service Date Service Provider Modifiers Qty    [id number]  PT THER PROC EA 15 MIN 4/22/2019 Shawna Hernández, PT GP 4    [id number]  PT THER SUPP EA 15 MIN 4/22/2019 Shawna Hernández, PT GP 1          PT G-Codes  Outcome Measure Options: Lower Extremity Functional Scale (LEFS)  Total: 39         Shawna Hernández PT, DPT, CSCS  4/22/2019

## 2019-04-24 ENCOUNTER — HOSPITAL ENCOUNTER (OUTPATIENT)
Dept: PHYSICAL THERAPY | Facility: HOSPITAL | Age: 44
Setting detail: THERAPIES SERIES
Discharge: HOME | End: 2019-04-24

## 2019-04-24 DIAGNOSIS — S76.191D OTHER SPECIFIED INJURY OF RIGHT QUADRICEPS MUSCLE, FASCIA AND TENDON, SUBSEQUENT ENCOUNTER: Primary | ICD-10-CM

## 2019-04-24 DIAGNOSIS — M25.561 ACUTE PAIN OF RIGHT KNEE: ICD-10-CM

## 2019-04-24 PROCEDURE — 97110 THERAPEUTIC EXERCISES: CPT

## 2019-04-24 NOTE — THERAPY TREATMENT NOTE
Outpatient Physical Therapy Ortho Treatment Note  [facility]  Emilee Regan PTA       Patient Name: Alexis Mariee  : 1975  MRN: [id number]  Today's Date: 2019      Visit Date: 2019     Visits: 8  Insurance Visits Approved: no precert limitations of 1 hour per day  Recert Due: 2019  MD Appt: TBD  Pain: pretreatment 0/10; post treatment 0/10  Improvement: pt is subjectively reporting 75% improvement since initial evaluation    Visit Dx:    ICD-10-CM ICD-9-CM   1. Other specified injury of right quadriceps muscle, fascia and tendon, subsequent encounter S76.191D 959.6   2. Acute pain of right knee M25.561 719.46       Patient Active Problem List   Diagnosis   • Essential hypertension   • Acute pain of right knee        Past Medical History:   Diagnosis Date   • Cellulitis and abscess of leg    • Cellulitis and abscess of trunk    • Cellulitis of trunk    • Deficiency of testosterone biosynthesis    • Encounter for general health examination    • Essential hypertension    • General medical exam    • Pain in lower limb    • Pain in upper limb         Past Surgical History:   Procedure Laterality Date   • BACK SURGERY  2017   • HIP SURGERY      fx left hip socket   • INJECTION OF MEDICATION  2012    Celestone (betamethasone)    • INJECTION OF MEDICATION  2012    Rocephin X 2   • ORIF FOREARM FRACTURE Left    • QUADRICEPS TENDON REPAIR Right 03/10/2019       PT Ortho     Row Name 19 1300       Subjective Pain    Able to rate subjective pain?  yes  -    Pre-Treatment Pain Level  0  -    Row Name 19 0800       Subjective Comments    Subjective Comments  Patient reports he had trouble sleeping last night because of his knee.  -AJ       Precautions and Contraindications    Contraindications  No active extension of R knee  (Significant)   -AJ       Subjective Pain    Able to rate subjective pain?  yes  -    Pre-Treatment Pain Level   "0  -    Post-Treatment Pain Level  0  -       Posture/Observations    Posture- WNL  Posture is WNL for B LEs  -    Observations  Edema;Muscle atrophy  -    Posture/Observations Comments  Comes in WBAT earing locked hinged knee brace. No disterss.  -       Special Tests/Palpation    Special Tests/Palpation  -- distal quad TTP.  -AJ       Right Lower Ext    Rt Knee Extension/Flexion AROM  2°-0°-108°  -       MMT (Manual Muscle Testing)    General MMT Comments  No lag with SLR  -       Sensation    Sensation WNL?  WFL  -    Light Touch  No apparent deficits  -    Additional Comments  Denies any tingling.  -AJ       RLE Quick Girth (cm)    Distal thigh  57.1 cm 6\" above joint line  -AJ    Proximal thigh  67 cm 12\" above joint line  -AJ    Other 1  42.3 cm KNee joint line  -AJ    Other 2  48.5 cm supra patellar  -AJ       LLE Quick Girth (cm)    Distal thigh  60.2 cm 6\" above joint line  -AJ    Proximal thigh  72 cm 12\" above joint line  -AJ    Other 1  40.8 cm knee joint line  -AJ    Other 2  49.3 cm supra patellar  -AJ       Transfers    Comment (Transfers)  I with all transfers with brace and use of UEs to A R LE.  -       Gait/Stairs Assessment/Training    Deviations/Abnormal Patterns (Gait)  right sided deviations;antalgic;base of support, wide  -    Comment (Gait/Stairs)  Ambulating with hinged knee brace on R LE, locked at 0°, wider SOLEDAD to accomidate locked knee brace.  -      User Key  (r) = Recorded By, (t) = Taken By, (c) = Cosigned By    Initials Name Provider Type     Emilee Regan PTA Physical Therapy Assistant    Shawna Mahan, PT Physical Therapist                      PT Assessment/Plan     Row Name 04/24/19 1300          PT Assessment    Assessment Comments  patient has difficulty with equal weightbearing with sit to stand activitiies. improves with visual cues but still very challenged.   -        PT Plan    PT Frequency  2x/week  -     PT Plan Comments  " Progress with increased strengthening, progressing out of brace, continue to work on equal weightbearing   -MH       User Key  (r) = Recorded By, (t) = Taken By, (c) = Cosigned By    Initials Name Provider Type     Emilee Regan PTA Physical Therapy Assistant          Modalities     Row Name 04/24/19 1300             Ice    Ice Applied  Yes  -MH      Location  right knee  -MH      Rx Minutes  Other: 20 mins  -MH      Ice S/P Rx  Yes  -MH        User Key  (r) = Recorded By, (t) = Taken By, (c) = Cosigned By    Initials Name Provider Type     Emilee Regan PTA Physical Therapy Assistant        Exercises     Row Name 04/24/19 1300             Subjective Comments    Subjective Comments  patient states that he is doing ok today.   -         Subjective Pain    Able to rate subjective pain?  yes  -      Pre-Treatment Pain Level  0  -MH         Exercise 1    Exercise Name 1  Pro II Fwd  -MH      Time 1  10 minutes  -MH      Additional Comments  L 7.0  -MH         Exercise 2    Exercise Name 2  B St. HS   -MH      Reps 2  2  -MH      Time 2  30 sec hold  -MH         Exercise 3    Exercise Name 3  R St. Lunge S  -MH      Reps 3  10  -MH      Time 3  10 sec hold  -MH         Exercise 4    Exercise Name 4  Step Up Fwd  -MH      Reps 4  20  -MH      Additional Comments  4 inch step  -MH         Exercise 5    Exercise Name 5  Step Up Retro/Down Fwd  -MH      Reps 5  20  -MH      Additional Comments  4 inch  -MH         Exercise 6    Exercise Name 6  Sit to/from stand with focus on even WB  -MH      Reps 6  20  -MH      Additional Comments  use of mirror, airx foam, and dynadisc to facilitate awareness of equal WB  -MH         Exercise 7    Exercise Name 7  Brace on  R Sing Leg Heel Raises   -MH      Reps 7  20  -MH         Exercise 8    Exercise Name 8  Biodex Balance Focus on equal WB left and right no brace  -MH      Time 8  3-4 minutes  -MH         Exercise 9    Exercise Name 9  Biodex Balance System Sit to stand  with focus on equal WB left and right (brace on half time)  -      Time 9  6-8 minutes  -        User Key  (r) = Recorded By, (t) = Taken By, (c) = Cosigned By    Initials Name Provider Type    Emilee Mujica, PTA Physical Therapy Assistant                       PT OP Goals     Row Name 04/24/19 1300          PT Short Term Goals    STG Date to Achieve  04/22/19  -     STG 1  I with HEP and have additions/changes by next recertification.  -     STG 1 Progress  Met  -     STG 2  AROM R knee 5° from full extension.  -     STG 2 Progress  Met  -     STG 3  AROM R knee flexion >= 90° of flexion.  -     STG 3 Progress  Met  -     STG 4  Patient able to perform 20 SLR with brace with no signficant lag present.  -     STG 4 Progress  Met  -     STG 5  Patient able to verbalize and abide by MD restrictions of no active extension and brace wear.  -     STG 5 Progress  Met  -        Long Term Goals    LTG Date to Achieve  06/14/19  -     LTG 1  AROM R knee 0°.  -     LTG 1 Progress  Met  -     LTG 2  AROM R knee flexion >= 125°.  -     LTG 2 Progress  Ongoing  -     LTG 3  B LE 5/5.  -     LTG 3 Progress  Ongoing  -     LTG 4  Circum measurements suprapatellar within 0.5cm of R=L.  -     LTG 4 Progress  Ongoing  -     LTG 5  Patient able to ambulate FWB, non-antalgic, 1/4 mile with no increase in pain.  -     LTG 5 Progress  Ongoing  -     LTG 6  Patient able to ambulate up/down 3 steps reciprocally x10 with no increase in pain.  -     LTG 6 Progress  Ongoing  -     LTG 7  Sit to/from stand x20, no UE A with = WB B LEs.  -     LTG 7 Progress  Ongoing  -     LTG 8  I with final HEP.  -     LTG 8 Progress  Ongoing  [facility]     LTG 9  D/C with final HEP and free 30 day fitness formula memembership.  -     LTG 9 Progress  Ongoing  [facility]        Time Calculation    PT Goal Re-Cert Due Date  05/13/19  -       User Key  (r) = Recorded By, (t) = Taken By, (c) = Cosigned By     Initials Name Provider Type     Emilee Regan PTA Physical Therapy Assistant          Therapy Education  Education Details: single leg heel raises, work on equal weight bearing iwht sit to stand  Given: HEP  Program: New, Reinforced  How Provided: Verbal, Demonstration  Provided to: Patient  Level of Understanding: Teach back education performed, Verbalized, Demonstrated              Time Calculation:   Start Time: 1300  Stop Time: 1420  Time Calculation (min): 80 min  PT Non-Billable Time (min): 20 min  Total Timed Code Minutes- PT: 60 minute(s)  Therapy Charges for Today     Code Description Service Date Service Provider Modifiers Qty    94632351239 HC PT THER PROC EA 15 MIN 4/24/2019 Emilee Regan PTA GP 4    43562882845 HC PT THER SUPP EA 15 MIN 4/24/2019 Emilee Regan PTA GP 1                    Emilee Regan PTA  4/24/2019

## 2019-04-30 ENCOUNTER — HOSPITAL ENCOUNTER (OUTPATIENT)
Dept: PHYSICAL THERAPY | Facility: HOSPITAL | Age: 44
Setting detail: THERAPIES SERIES
Discharge: HOME | End: 2019-04-30

## 2019-04-30 DIAGNOSIS — M25.561 ACUTE PAIN OF RIGHT KNEE: ICD-10-CM

## 2019-04-30 DIAGNOSIS — S76.191D OTHER SPECIFIED INJURY OF RIGHT QUADRICEPS MUSCLE, FASCIA AND TENDON, SUBSEQUENT ENCOUNTER: Primary | ICD-10-CM

## 2019-04-30 PROCEDURE — 97110 THERAPEUTIC EXERCISES: CPT

## 2019-05-02 ENCOUNTER — HOSPITAL ENCOUNTER (OUTPATIENT)
Dept: PHYSICAL THERAPY | Facility: HOSPITAL | Age: 44
Setting detail: THERAPIES SERIES
Discharge: HOME | End: 2019-05-02

## 2019-05-02 DIAGNOSIS — S76.191D OTHER SPECIFIED INJURY OF RIGHT QUADRICEPS MUSCLE, FASCIA AND TENDON, SUBSEQUENT ENCOUNTER: Primary | ICD-10-CM

## 2019-05-02 DIAGNOSIS — M25.561 ACUTE PAIN OF RIGHT KNEE: ICD-10-CM

## 2019-05-02 PROCEDURE — 97110 THERAPEUTIC EXERCISES: CPT

## 2019-05-02 NOTE — THERAPY TREATMENT NOTE
Outpatient Physical Therapy Ortho Treatment Note  [facility]  Emilee Regan PTA         Patient Name: Alexis Mariee  : 1975  MRN: [id number]  Today's Date: 2019      Visit Date: 2019     Visits: 10/10  Insurance Visits Approved: no precert limitations of 1 hour per day  Recert Due: 2019  MD Appt:   Pain: pretreatment 0/10; post treatment 0/10  Improvement: pt is subjectively reporting 75% improvement since initial evaluation        Visit Dx:    ICD-10-CM ICD-9-CM   1. Other specified injury of right quadriceps muscle, fascia and tendon, subsequent encounter S76.191D 959.6   2. Acute pain of right knee M25.561 719.46       Patient Active Problem List   Diagnosis   • Essential hypertension   • Acute pain of right knee        Past Medical History:   Diagnosis Date   • Cellulitis and abscess of leg    • Cellulitis and abscess of trunk    • Cellulitis of trunk    • Deficiency of testosterone biosynthesis    • Encounter for general health examination    • Essential hypertension    • General medical exam    • Pain in lower limb    • Pain in upper limb         Past Surgical History:   Procedure Laterality Date   • BACK SURGERY  2017   • HIP SURGERY      fx left hip socket   • INJECTION OF MEDICATION  2012    Celestone (betamethasone)    • INJECTION OF MEDICATION  2012    Rocephin X 2   • ORIF FOREARM FRACTURE Left    • QUADRICEPS TENDON REPAIR Right 03/10/2019       PT Ortho     Row Name 19 1300       Subjective Comments    Subjective Comments  states that he is doing well. denies pain. able to do the EFX longer  -       Subjective Pain    Able to rate subjective pain?  yes  -    Pre-Treatment Pain Level  0  -    Row Name 19 1300       Subjective Comments    Subjective Comments  doing ok. been using the treadmill and elevating the incline on it.   -       Subjective Pain    Able to rate subjective pain?  yes  -     Pre-Treatment Pain Level  0  -       Right Lower Ext    Rt Knee Extension/Flexion AROM  112° right knee lfexion  -      User Key  (r) = Recorded By, (t) = Taken By, (c) = Cosigned By    Initials Name Provider Type     Emilee Regan PTA Physical Therapy Assistant                      PT Assessment/Plan     Row Name 05/02/19 1300          PT Assessment    Assessment Comments  patient did well with therex. able to SLS on each extremity for 30 seconds iwth no LOB. good form with florez press  -        PT Plan    PT Frequency  2x/week  -     PT Plan Comments  assess ROM next visit  -       User Key  (r) = Recorded By, (t) = Taken By, (c) = Cosigned By    Initials Name Provider Type     Emilee Regan PTA Physical Therapy Assistant          Modalities     Row Name 05/02/19 1300             Ice    Ice Applied  Yes  -      Location  right knee  -      Rx Minutes  15 mins  -      Ice S/P Rx  Yes  -        User Key  (r) = Recorded By, (t) = Taken By, (c) = Cosigned By    Initials Name Provider Type     Emilee Regan PTA Physical Therapy Assistant        Exercises     Row Name 05/02/19 1300             Subjective Comments    Subjective Comments  states that he is doing well. denies pain. able to do the EFX longer  -         Subjective Pain    Able to rate subjective pain?  yes  -      Pre-Treatment Pain Level  0  -      Post-Treatment Pain Level  0  -         Exercise 1    Exercise Name 1  EFX   -      Time 1  10 minutes  -         Exercise 2    Exercise Name 2  Wall Sits  -      Reps 2  10  -      Time 2  10 sec hold  -         Exercise 3    Exercise Name 3  Reciprocal Stairs  -      Reps 3  2 flights  -         Exercise 4    Exercise Name 4  Ecc Step Downs  -      Reps 4  20  -      Additional Comments  6 inch step  -         Exercise 5    Exercise Name 5  Airx Hovertoss  -      Reps 5  3  -      Time 5  30 sec hold  -      Additional Comments  4# med ball  -          Exercise 6    Exercise Name 6  B SLS  -      Reps 6  3  -      Time 6  30 sec hold each leg  -         Exercise 7    Exercise Name 7  Sport Cord Arc, Lat Stepping fwd facing  -      Reps 7  3 laps  -      Additional Comments  Lrg Cord  -         Exercise 8    Exercise Name 8  Cybex Leg Press   -      Time 8  5 minutes  -      Additional Comments  10 plates  -         Exercise 9    Exercise Name 9  Cuadra Press  -      Reps 9  10  -      Additional Comments  Bar Only  -         Exercise 10    Exercise Name 10  Sport Cord Arc bckward facing  -      Reps 10  3 laps  -      Additional Comments  Lrg Cord  -        User Key  (r) = Recorded By, (t) = Taken By, (c) = Cosigned By    Initials Name Provider Type    Emilee Mujica, PTA Physical Therapy Assistant                       PT OP Goals     Row Name 05/02/19 1300          PT Short Term Goals    STG Date to Achieve  04/22/19  -     STG 1  I with HEP and have additions/changes by next recertification.  -     STG 1 Progress  Met  [facility]     STG 2  AROM R knee 5° from full extension.  -     STG 2 Progress  Met  [facility]     STG 3  AROM R knee flexion >= 90° of flexion.  -     STG 3 Progress  Met  [facility]     STG 4  Patient able to perform 20 SLR with brace with no signficant lag present.  -     STG 4 Progress  Met  -     STG 5  Patient able to verbalize and abide by MD restrictions of no active extension and brace wear.  -     STG 5 Progress  Met  [facility]        Long Term Goals    LTG Date to Achieve  06/14/19  -     LTG 1  AROM R knee 0°.  -     LTG 1 Progress  Met  [facility]     LTG 2  AROM R knee flexion >= 125°.  -     LTG 2 Progress  Ongoing  [facility]     LTG 3  B LE 5/5.  -     LTG 3 Progress  Ongoing  [facility]     LTG 4  Circum measurements suprapatellar within 0.5cm of R=L.  -     LTG 4 Progress  Ongoing  [facility]     LTG 5  Patient able to ambulate FWB, non-antalgic, 1/4 mile with no increase in pain.  -     LTG 5 Progress  Ongoing  [facility]      LTG 6  Patient able to ambulate up/down 3 steps reciprocally x10 with no increase in pain.  -     LTG 6 Progress  Met;Ongoing  -     LTG 7  Sit to/from stand x20, no UE A with = WB B LEs.  -     LTG 7 Progress  Progressing  -     LTG 8  I with final HEP.  -     LTG 8 Progress  Ongoing  -     LTG 9  D/C with final HEP and free 30 day fitness formula memembership.  -     LTG 9 Progress  Ongoing  -        Time Calculation    PT Goal Re-Cert Due Date  05/13/19  -       User Key  (r) = Recorded By, (t) = Taken By, (c) = Cosigned By    Initials Name Provider Type     Emilee Regan PTA Physical Therapy Assistant          Therapy Education  Education Details: ok to start smith press bar only in fitness  Given: HEP  Program: Reinforced, New  How Provided: Verbal, Demonstration  Provided to: Patient  Level of Understanding: Teach back education performed, Verbalized, Demonstrated              Time Calculation:   Start Time: 1300  Stop Time: 1410  Time Calculation (min): 70 min  PT Non-Billable Time (min): 15 min  Total Timed Code Minutes- PT: 55 minute(s)  Therapy Charges for Today     Code Description Service Date Service Provider Modifiers Qty    95492357375 HC PT THER PROC EA 15 MIN 5/2/2019 Emilee Regan PTA GP 4    98684158682 HC PT THER SUPP EA 15 MIN 5/2/2019 Emilee Regan PTA GP 1                    Emilee Regan PTA  5/2/2019

## 2019-05-06 ENCOUNTER — HOSPITAL ENCOUNTER (OUTPATIENT)
Dept: PHYSICAL THERAPY | Facility: HOSPITAL | Age: 44
Setting detail: THERAPIES SERIES
Discharge: HOME | End: 2019-05-06

## 2019-05-06 DIAGNOSIS — S76.191D OTHER SPECIFIED INJURY OF RIGHT QUADRICEPS MUSCLE, FASCIA AND TENDON, SUBSEQUENT ENCOUNTER: Primary | ICD-10-CM

## 2019-05-06 DIAGNOSIS — M25.561 ACUTE PAIN OF RIGHT KNEE: ICD-10-CM

## 2019-05-06 PROCEDURE — 97110 THERAPEUTIC EXERCISES: CPT | Performed by: PHYSICAL THERAPIST

## 2019-05-06 NOTE — THERAPY PROGRESS REPORT/RE-CERT
Outpatient Physical Therapy Ortho Progress Note  [facility]  Shawna Hernández, PT, DPT, CSCS       Patient Name: Alexis Mariee  : 1975  MRN: [id number]  Today's Date: 2019      Visit Date: 2019     Pt reports 0/10 pain pre treatment, 0/10 pain post treatment  Reports 75% of improvement.  Attended  visits.  Insurance available: no precert limitations of 1 hour per day  Next MD appt:2019.  Recertification: 2019.    Visit Dx:    ICD-10-CM ICD-9-CM   1. Other specified injury of right quadriceps muscle, fascia and tendon, subsequent encounter S76.191D 959.6   2. Acute pain of right knee M25.561 719.46       Patient Active Problem List   Diagnosis   • Essential hypertension   • Acute pain of right knee        Past Medical History:   Diagnosis Date   • Cellulitis and abscess of leg    • Cellulitis and abscess of trunk    • Cellulitis of trunk    • Deficiency of testosterone biosynthesis    • Encounter for general health examination    • Essential hypertension    • General medical exam    • Pain in lower limb    • Pain in upper limb         Past Surgical History:   Procedure Laterality Date   • BACK SURGERY  2017   • HIP SURGERY      fx left hip socket   • INJECTION OF MEDICATION  2012    Celestone (betamethasone)    • INJECTION OF MEDICATION  2012    Rocephin X 2   • ORIF FOREARM FRACTURE Left    • QUADRICEPS TENDON REPAIR Right 03/10/2019     Number of days off work: Off since accident    Changes to medications: None noted.    Changes to MD orders: None noted.    PT Ortho     Row Name 19 1300       Subjective Comments    Subjective Comments  Patient reports his only complaint is at night he is restless and trouble getting to sleep.  -AJ       Subjective Pain    Able to rate subjective pain?  yes  -JANIE    Pre-Treatment Pain Level  0  -AJ       Posture/Observations    Posture- WNL  Posture is WNL for B LEs  -AJ    Observations   "Edema;Muscle atrophy  -AJ       Special Tests/Palpation    Special Tests/Palpation  -- No areas of TTP.  -AJ       Right Lower Ext    Rt Knee Extension/Flexion AROM  0°-126°  -AJ       MMT (Manual Muscle Testing)    General MMT Comments  B LE 5/5, except R QS 4/5  -AJ       Sensation    Sensation WNL?  WFL  -AJ    Light Touch  No apparent deficits  -AJ    Additional Comments  Denies any tingling.  -AJ       RLE Quick Girth (cm)    Distal thigh  57.1 cm 6\" up from lateral joint line  -AJ    Proximal thigh  69 cm 12\" up from lateral joint line  -AJ    Other 2  47.5 cm suprapatellar  -AJ       Transfers    Comment (Transfers)  I with all transfers, shifts weight to L LE sit to/from stand  -AJ       Gait/Stairs Assessment/Training    Deviations/Abnormal Patterns (Gait)  right sided deviations;antalgic;base of support, wide  -AJ    Comment (Gait/Stairs)  Ambulates non-natlagically, tens to take a short stride on R and still maintains a slightly wider SOLEDAD.  -AJ      User Key  (r) = Recorded By, (t) = Taken By, (c) = Cosigned By    Initials Name Provider Type    AJ Shawna Hernández, PT Physical Therapist           Barriers to Rehab: Include significant or possible arthritic/degenerative changes that have occurred within the joint.    Safety Issues: None noted.    PT Assessment/Plan     Row Name 05/06/19 1300          PT Assessment    Functional Limitations  Impaired gait;Limitation in home management;Limitations in community activities;Performance in leisure activities;Performance in self-care ADL;Performance in work activities  -     Impairments  Balance;Endurance;Edema;Gait;Impaired muscle endurance;Impaired muscle length;Impaired muscle power;Joint mobility;Motor function;Muscle strength;Pain;Range of motion  -AJ     Assessment Comments  Patient is improving overall. Still tends to compensate with contralateral leg with B LE activites such as sit to/from stand, stairs (pushes off with contralateral limb), sport " cords, shifts weight to the L. Improvement in muscle mass and less swelling overall.  -AJ     Rehab Potential  Good  -AJ     Patient/caregiver participated in establishment of treatment plan and goals  Yes  -AJ     Patient would benefit from skilled therapy intervention  Yes  -AJ        PT Plan    PT Frequency  2x/week  -AJ     Predicted Duration of Therapy Intervention (Therapy Eval)  4-6 more weeks  -AJ     Planned CPT's?  PT RE-EVAL: 42573;PT THER PROC EA 15 MIN: 47779;PT THER ACT EA 15 MIN: 25549;PT MANUAL THERAPY EA 15 MIN: 21957;PT GAIT TRAINING EA 15 MIN: 17925;PT THER SUPP EA 15 MIN  -AJ     Physical Therapy Interventions (Optional Details)  balance training;gait training;gross motor skills;home exercise program;modalities;neuromuscular re-education;patient/family education;postural re-education;ROM (Range of Motion);strengthening;stretching  -AJ     PT Plan Comments  Continue to progress overall strength and endurance. Add back Biodex sit to/from stand. Add SLS  -AJ       User Key  (r) = Recorded By, (t) = Taken By, (c) = Cosigned By    Initials Name Provider Type    Shawna Mahan, PT Physical Therapist       Other therapeutic activities and/or exercises will be prescribed depending on the patients progress or lack there of.    Modalities     Row Name 05/06/19 1300             Ice    Ice Applied  Yes  -AJ      Location  right knee  -AJ      Rx Minutes  15 mins  -AJ      Ice S/P Rx  Yes  -AJ        User Key  (r) = Recorded By, (t) = Taken By, (c) = Cosigned By    Initials Name Provider Type    Shawna Mahan, PT Physical Therapist        Exercises     Row Name 05/06/19 1300             Subjective Comments    Subjective Comments  Patient reports his only complaint is at night he is restless and trouble getting to sleep.  -AJ         Subjective Pain    Able to rate subjective pain?  yes  -AJ      Pre-Treatment Pain Level  0  -AJ         Exercise 1    Exercise Name 1  EFX   -AJ      Time 1   "10 minutes  -AJ      Additional Comments  L 20.0  -AJ         Exercise 2    Exercise Name 2  Prone Quad S with strap  -AJ      Reps 2  2  -AJ      Time 2  30 seconds  -AJ         Exercise 3    Exercise Name 3  LAQ with ER  -AJ      Reps 3  20  -AJ      Time 3  5\" hold  -AJ      Additional Comments  3# weight  -AJ         Exercise 4    Exercise Name 4  Sit to/from stand  -AJ      Cueing 4  Verbal  -AJ      Reps 4  20  -AJ      Additional Comments  no UE A  -AJ         Exercise 5    Exercise Name 5  Gym: Track Walk  -AJ      Reps 5  4 laps  -AJ         Exercise 6    Exercise Name 6  Ecc step downs  -AJ      Reps 6  50  -AJ      Time 6  91 seconds  -AJ         Exercise 7    Exercise Name 7  up 2 steps of 6ft ladder  -AJ      Reps 7  5  -AJ         Exercise 8    Exercise Name 8  Large sport cord arc F/R  -AJ      Reps 8  3 laps each  -AJ        User Key  (r) = Recorded By, (t) = Taken By, (c) = Cosigned By    Initials Name Provider Type    Shawna Mahan, PT Physical Therapist                       PT OP Goals     Row Name 05/06/19 1300          PT Short Term Goals    STG Date to Achieve  04/22/19  -AJ     STG 1  I with HEP and have additions/changes by next recertification.  -AJ     STG 1 Progress  Met  -     STG 2  AROM R knee 5° from full extension.  -AJ     STG 2 Progress  Met  -     STG 3  AROM R knee flexion >= 90° of flexion.  -     STG 3 Progress  Met  -     STG 4  Patient able to perform 20 SLR with brace with no signficant lag present.  -     STG 4 Progress  Met  -     STG 5  Patient able to verbalize and abide by MD restrictions of no active extension and brace wear.  -     STG 5 Progress  Met  -     STG 6  Patient able to perform eccentric steps down on 6\" step with no UE support for 90 seconds doing at least 50 reps.  -AJ     STG 6 Progress  New  -     STG 6 Progress Comments  .  -     STG 7  Patient able to perform a 30 second wall sit with = WB B LEs.  -     STG 7 Progress  " "New  -     STG 7 Progress Comments  .  -        Long Term Goals    LTG Date to Achieve  06/14/19  -     LTG 1  AROM R knee 0°.  -     LTG 1 Progress  Met  -     LTG 2  AROM R knee flexion >= 125°.  -     LTG 2 Progress  Met  -     LTG 3  B LE 5/5.  -     LTG 3 Progress  Ongoing;Partially Met  -     LTG 4  Circum measurements suprapatellar within 0.5cm of R=L.  -     LTG 4 Progress  Met  -     LTG 5  Patient able to ambulate FWB, non-antalgic, 1/4 mile with no increase in pain.  -     LTG 5 Progress  Partially Met;Ongoing  -     LTG 6  Patient able to ambulate up/down 3 steps reciprocally x10 with no increase in pain.  -     LTG 6 Progress  Ongoing;Partially Met  -     LTG 7  Sit to/from stand x20, no UE A with = WB B LEs.  -     LTG 7 Progress  Partially Met;Progressing  -     LTG 8  I with final HEP and D/C with a free 30 day fitness formula [employer].  -     LTG 8 Progress  Goal Revised  -     LTG 9  Patient able to perform eccentric steps down on 6\" step with no UE support for 2 minutes  doing at least 75 reps.  -     LTG 9 Progress  New  -     LTG 10  Patient able to ascend and descend a 6 ft ladder for 5 minutes with no increase in pain or fatigue.  -     LTG 10 Progress  New  -        Time Calculation    PT Goal Re-Cert Due Date  05/30/19  -       User Key  (r) = Recorded By, (t) = Taken By, (c) = Cosigned By    Initials Name Provider Type    Shawna Mahan, PT Physical Therapist          Therapy Education  Given: HEP, Symptoms/condition management, Edema management(POC)  Program: Reinforced  How Provided: Verbal, Demonstration  Provided to: Patient  Level of Understanding: Verbalized, Demonstrated    Outcome Measure Options: Lower Extremity Functional Scale (LEFS)  Lower Extremity Functional Index  Any of your usual work, housework or school activities: A little bit of difficulty  Your usual hobbies, recreational or sporting activities: A little bit " of difficulty  Getting into or out of the bath: A little bit of difficulty  Walking between rooms: No difficulty  Putting on your shoes or socks: A little bit of difficulty  Squatting: A little bit of difficulty  Lifting an object, like a bag of groceries from the floor: No difficulty  Performing light activities around your home: No difficulty  Performing heavy activities around your home: Moderate difficulty  Getting into or out of a car: A little bit of difficulty  Walking 2 blocks: A little bit of difficulty  Walking a mile: Moderate difficulty  Going up or down 10 stairs (about 1 flight of stairs): Moderate difficulty  Standing for 1 hour: No difficulty  Sitting for 1 hour: A little bit of difficulty  Running on even ground: Extreme difficulty or unable to perform activity  Running on uneven ground: Extreme difficulty or unable to perform activity  Making sharp turns while running fast: Extreme difficulty or unable to perform activity  Hopping: Extreme difficulty or unable to perform activity  Rolling over in bed: A little bit of difficulty  Total: 49      Time Calculation:   Start Time: 1300  Stop Time: 1412  Time Calculation (min): 72 min  PT Non-Billable Time (min): 15 min  Total Timed Code Minutes- PT: 57 minute(s)  Therapy Charges for Today     Code Description Service Date Service Provider Modifiers Qty    72501621581 HC PT THER PROC EA 15 MIN 5/6/2019 Shawna Hernández, PT GP 4    88621567873 HC PT THER SUPP EA 15 MIN 5/6/2019 Shawna Hernández, PT GP 1          PT G-Codes  Outcome Measure Options: Lower Extremity Functional Scale (LEFS)  Total: 49         Shawna Hernández PT, DPT, CSCS  5/6/2019

## 2019-05-08 ENCOUNTER — HOSPITAL ENCOUNTER (OUTPATIENT)
Dept: PHYSICAL THERAPY | Facility: HOSPITAL | Age: 44
Setting detail: THERAPIES SERIES
Discharge: HOME | End: 2019-05-08

## 2019-05-08 DIAGNOSIS — S76.191D OTHER SPECIFIED INJURY OF RIGHT QUADRICEPS MUSCLE, FASCIA AND TENDON, SUBSEQUENT ENCOUNTER: Primary | ICD-10-CM

## 2019-05-08 DIAGNOSIS — M25.561 ACUTE PAIN OF RIGHT KNEE: ICD-10-CM

## 2019-05-08 PROCEDURE — 97110 THERAPEUTIC EXERCISES: CPT

## 2019-05-08 NOTE — THERAPY TREATMENT NOTE
Outpatient Physical Therapy Ortho Treatment Note  [facility]  Emilee Regan PTA       Patient Name: Alexis Mariee  : 1975  MRN: [id number]  Today's Date: 2019      Visit Date: 2019     Visits:   Insurance Visits Approved: 1 hour limitation, no precert  Recert Due: 2019  MD Appt: 2019  Pain: pretreatment 0/10; post treatment 0/10  Improvement: pt is subjectively reporting 75% improvement since initial evaluation    Visit Dx:    ICD-10-CM ICD-9-CM   1. Other specified injury of right quadriceps muscle, fascia and tendon, subsequent encounter S76.191D 959.6   2. Acute pain of right knee M25.561 719.46       Patient Active Problem List   Diagnosis   • Essential hypertension   • Acute pain of right knee        Past Medical History:   Diagnosis Date   • Cellulitis and abscess of leg    • Cellulitis and abscess of trunk    • Cellulitis of trunk    • Deficiency of testosterone biosynthesis    • Encounter for general health examination    • Essential hypertension    • General medical exam    • Pain in lower limb    • Pain in upper limb         Past Surgical History:   Procedure Laterality Date   • BACK SURGERY  2017   • HIP SURGERY      fx left hip socket   • INJECTION OF MEDICATION  2012    Celestone (betamethasone)    • INJECTION OF MEDICATION  2012    Rocephin X 2   • ORIF FOREARM FRACTURE Left    • QUADRICEPS TENDON REPAIR Right 03/10/2019       PT Ortho     Row Name 19 1000       Subjective Comments    Subjective Comments  states that he's been on his legs a lot today and feels a bit swollen today.   -       Subjective Pain    Able to rate subjective pain?  yes  -    Pre-Treatment Pain Level  0  -    Post-Treatment Pain Level  0  -    Row Name 19 1300       Subjective Comments    Subjective Comments  Patient reports his only complaint is at night he is restless and trouble getting to sleep.  -AJ       Subjective  "Pain    Able to rate subjective pain?  yes  -    Pre-Treatment Pain Level  0  -       Posture/Observations    Posture- WNL  Posture is WNL for B LEs  -    Observations  Edema;Muscle atrophy  -       Special Tests/Palpation    Special Tests/Palpation  -- No areas of TTP.  -AJ       Right Lower Ext    Rt Knee Extension/Flexion AROM  0°-126°  -       MMT (Manual Muscle Testing)    General MMT Comments  B LE 5/5, except R QS 4/5  -       Sensation    Sensation WNL?  WFL  -    Light Touch  No apparent deficits  -    Additional Comments  Denies any tingling.  -       RLE Quick Girth (cm)    Distal thigh  57.1 cm 6\" up from lateral joint line  -    Proximal thigh  69 cm 12\" up from lateral joint line  -    Other 2  47.5 cm suprapatellar  -       Transfers    Comment (Transfers)  I with all transfers, shifts weight to L LE sit to/from stand  -       Gait/Stairs Assessment/Training    Deviations/Abnormal Patterns (Gait)  right sided deviations;antalgic;base of support, wide  -    Comment (Gait/Stairs)  Ambulates non-natlagically, tens to take a short stride on R and still maintains a slightly wider SOLEDAD.  -      User Key  (r) = Recorded By, (t) = Taken By, (c) = Cosigned By    Initials Name Provider Type     Emilee Regan PTA Physical Therapy Assistant    Shawna Mahan, PT Physical Therapist                      PT Assessment/Plan     Row Name 05/08/19 1000          PT Assessment    Assessment Comments  patient improved endurance with Ecc Step Downs. no increase in complaints of pain.   -        PT Plan    PT Frequency  2x/week  -     PT Plan Comments  next add BOSU step ups, resume biodex sit to/from stand  -       User Key  (r) = Recorded By, (t) = Taken By, (c) = Cosigned By    Initials Name Provider Type    Emilee Mujica PTA Physical Therapy Assistant            Exercises     Row Name 05/08/19 1000             Subjective Comments    Subjective Comments  states that " "he's been on his legs a lot today and feels a bit swollen today.   -         Subjective Pain    Able to rate subjective pain?  yes  -      Pre-Treatment Pain Level  0  -      Post-Treatment Pain Level  0  -         Exercise 1    Exercise Name 1  Treadmill varied incline and speed  -      Time 1  10 minutes  -         Exercise 2    Exercise Name 2  B St. HS S  -MH      Reps 2  2  -MH      Time 2  30 sec hold  -         Exercise 3    Exercise Name 3  Prone Quad S  -      Reps 3  2  -      Time 3  1 minute  -         Exercise 4    Exercise Name 4  LAQ with ER  -      Reps 4  20  -      Time 4  5 sec hold  -      Additional Comments  3# weight  -         Exercise 5    Exercise Name 5  ECC Step Downs  -      Reps 5  75  -      Time 5  1 min 54 sconds  -         Exercise 6    Exercise Name 6  Lrg Sport Cord F/R facing  -      Reps 6  4 laps each  -         Exercise 7    Exercise Name 7  SLS B  -      Reps 7  3  -      Time 7  1 minute each  -        User Key  (r) = Recorded By, (t) = Taken By, (c) = Cosigned By    Initials Name Provider Type    Emilee Mujica, PTA Physical Therapy Assistant                       PT OP Goals     Row Name 05/08/19 1000          PT Short Term Goals    STG Date to Achieve  04/22/19  -     STG 1  I with HEP and have additions/changes by next recertification.  -     STG 1 Progress  Met  -     STG 2  AROM R knee 5° from full extension.  -     STG 2 Progress  Met  -     STG 3  AROM R knee flexion >= 90° of flexion.  -     STG 3 Progress  Met  [facility]     STG 4  Patient able to perform 20 SLR with brace with no signficant lag present.  -     STG 4 Progress  Met  [facility]     STG 5  Patient able to verbalize and abide by MD restrictions of no active extension and brace wear.  -     STG 5 Progress  Met  -     STG 6  Patient able to perform eccentric steps down on 6\" step with no UE support for 90 seconds doing at least 50 reps.  -     STG 6 " "Progress  New  -     STG 6 Progress Comments  .  -     STG 7  Patient able to perform a 30 second wall sit with = WB B LEs.  -     STG 7 Progress  New  -     STG 7 Progress Comments  .  -        Long Term Goals    LTG Date to Achieve  06/14/19  -     LTG 1  AROM R knee 0°.  -     LTG 1 Progress  Met  -     LTG 2  AROM R knee flexion >= 125°.  -     LTG 2 Progress  Met  -     LTG 3  B LE 5/5.  -     LTG 3 Progress  Ongoing;Partially Met  -     LTG 4  Circum measurements suprapatellar within 0.5cm of R=L.  -     LTG 4 Progress  Met  -     LTG 5  Patient able to ambulate FWB, non-antalgic, 1/4 mile with no increase in pain.  -     LTG 5 Progress  Partially Met;Ongoing  -     LTG 6  Patient able to ambulate up/down 3 steps reciprocally x10 with no increase in pain.  -     LTG 6 Progress  Ongoing;Partially Met  -     LTG 7  Sit to/from stand x20, no UE A with = WB B LEs.  -     LTG 7 Progress  Partially Met;Progressing  -     LTG 8  I with final HEP and D/C with a free 30 day fitness formula [facility].  -     LTG 8 Progress  Goal Revised  -     LTG 9  Patient able to perform eccentric steps down on 6\" step with no UE support for 2 minutes  doing at least 75 reps.  -     LTG 9 Progress  New  -     LTG 10  Patient able to ascend and descend a 6 ft ladder for 5 minutes with no increase in pain or fatigue.  -     LTG 10 Progress  New  -        Time Calculation    PT Goal Re-Cert Due Date  05/30/19  -       User Key  (r) = Recorded By, (t) = Taken By, (c) = Cosigned By    Initials Name Provider Type     Emilee Regan PTA Physical Therapy Assistant                         Time Calculation:   Start Time: 1015  Stop Time: 1105  Time Calculation (min): 50 min  Total Timed Code Minutes- PT: 50 minute(s)  Therapy Charges for Today     Code Description Service Date Service Provider Modifiers Qty    47844249204 HC PT THER PROC EA 15 MIN 5/8/2019 Emilee Regan PTA GP 3    " [id number]  PT THER SUPP EA 15 MIN 5/8/2019 Emilee Regan, JOCELYNN GP 1                    Emilee Regan PTA  5/8/2019

## 2019-05-09 ENCOUNTER — APPOINTMENT (OUTPATIENT)
Dept: PHYSICAL THERAPY | Facility: HOSPITAL | Age: 44
End: 2019-05-09

## 2019-05-13 ENCOUNTER — HOSPITAL ENCOUNTER (OUTPATIENT)
Dept: PHYSICAL THERAPY | Facility: HOSPITAL | Age: 44
Setting detail: THERAPIES SERIES
Discharge: HOME | End: 2019-05-13

## 2019-05-13 DIAGNOSIS — S76.191D OTHER SPECIFIED INJURY OF RIGHT QUADRICEPS MUSCLE, FASCIA AND TENDON, SUBSEQUENT ENCOUNTER: Primary | ICD-10-CM

## 2019-05-13 DIAGNOSIS — M25.561 ACUTE PAIN OF RIGHT KNEE: ICD-10-CM

## 2019-05-13 PROCEDURE — 97110 THERAPEUTIC EXERCISES: CPT

## 2019-05-13 NOTE — THERAPY TREATMENT NOTE
Outpatient Physical Therapy Ortho Treatment Note  [facility]  Emilee Regan PTA       Patient Name: Alexis Mariee  : 1975  MRN: [id number]  Today's Date: 2019      Visit Date: 2019     Visits:   Insurance Visits Approved: 1 hour limitation, no precert  Recert Due: 2019  MD Appt: 2019  Pain: pretreatment 0/10; post treatment 0/10  Improvement: pt is subjectively reporting 75% improvement since initial evaluation    Visit Dx:    ICD-10-CM ICD-9-CM   1. Other specified injury of right quadriceps muscle, fascia and tendon, subsequent encounter S76.191D 959.6   2. Acute pain of right knee M25.561 719.46       Patient Active Problem List   Diagnosis   • Essential hypertension   • Acute pain of right knee        Past Medical History:   Diagnosis Date   • Cellulitis and abscess of leg    • Cellulitis and abscess of trunk    • Cellulitis of trunk    • Deficiency of testosterone biosynthesis    • Encounter for general health examination    • Essential hypertension    • General medical exam    • Pain in lower limb    • Pain in upper limb         Past Surgical History:   Procedure Laterality Date   • BACK SURGERY  2017   • HIP SURGERY      fx left hip socket   • INJECTION OF MEDICATION  2012    Celestone (betamethasone)    • INJECTION OF MEDICATION  2012    Rocephin X 2   • ORIF FOREARM FRACTURE Left    • QUADRICEPS TENDON REPAIR Right 03/10/2019       PT Ortho     Row Name 19 1300       Subjective Comments    Subjective Comments  doing well. denies pain.   -       Subjective Pain    Able to rate subjective pain?  yes  -    Pre-Treatment Pain Level  0  -    Post-Treatment Pain Level  0  -      User Key  (r) = Recorded By, (t) = Taken By, (c) = Cosigned By    Initials Name Provider Type     Emilee Regan PTA Physical Therapy Assistant                      PT Assessment/Plan     Row Name 19 1300          PT Assessment     Assessment Comments  patient hesitant with taller step height stepping down and up. good effort throughout treatment. with repetition able to decrease hesitency  -        PT Plan    PT Frequency  2x/week  -     PT Plan Comments  Airx Foam Stand to 1/2 kneel to tall kneel to crawl  -       User Key  (r) = Recorded By, (t) = Taken By, (c) = Cosigned By    Initials Name Provider Type     Emilee Regan PTA Physical Therapy Assistant            Exercises     Row Name 05/13/19 1300             Subjective Comments    Subjective Comments  doing well. denies pain.   -         Subjective Pain    Able to rate subjective pain?  yes  -      Pre-Treatment Pain Level  0  -      Post-Treatment Pain Level  0  -         Exercise 1    Exercise Name 1  Treadmill varied incline and speed  -      Time 1  10 minutes  -         Exercise 2    Exercise Name 2  Prone Quad S  -      Reps 2  3  -      Time 2  1 minute each  -         Exercise 3    Exercise Name 3  CC Resisted Walk Outs fwd  -      Reps 3  10  -      Additional Comments  10 plates  -         Exercise 4    Exercise Name 4  CC Ressited Walkouts Retro  -      Reps 4  10  -      Additional Comments  10 plates   -         Exercise 5    Exercise Name 5  CC Resisted Walk outs Lat B  -      Reps 5  10  -      Additional Comments  6 plates  -         Exercise 6    Exercise Name 6  Step Ladder 1st rung R lead  -      Reps 6  10  -         Exercise 7    Exercise Name 7  Reciprocal Stairs Fwd Up/ Retro Down  -      Reps 7  10  -         Exercise 8    Exercise Name 8  8 inch step up  -      Reps 8  20  -         Exercise 9    Exercise Name 9  BOSU Step Up Fwd  -      Reps 9  20  -         Exercise 10    Exercise Name 10  Inverted BOSU Mini Squats  -      Reps 10  20  -MH        User Key  (r) = Recorded By, (t) = Taken By, (c) = Cosigned By    Initials Name Provider Type     Emilee Regan PTA Physical Therapy Assistant     "                   PT OP Goals     Row Name 05/13/19 1300          PT Short Term Goals    STG Date to Achieve  04/22/19  -     STG 1  I with HEP and have additions/changes by next recertification.  -     STG 1 Progress  Met  -     STG 2  AROM R knee 5° from full extension.  -     STG 2 Progress  Met  -     STG 3  AROM R knee flexion >= 90° of flexion.  -     STG 3 Progress  Met  -     STG 4  Patient able to perform 20 SLR with brace with no signficant lag present.  -     STG 4 Progress  Met  -     STG 5  Patient able to verbalize and abide by MD restrictions of no active extension and brace wear.  -     STG 5 Progress  Met  -     STG 6  Patient able to perform eccentric steps down on 6\" step with no UE support for 90 seconds doing at least 50 reps.  -     STG 6 Progress  New  -     STG 6 Progress Comments  .  -     STG 7  Patient able to perform a 30 second wall sit with = WB B LEs.  -     STG 7 Progress  New  -     STG 7 Progress Comments  .  -        Long Term Goals    LTG Date to Achieve  06/14/19  -     LTG 1  AROM R knee 0°.  -     LTG 1 Progress  Met  -     LTG 2  AROM R knee flexion >= 125°.  -     LTG 2 Progress  Met  -     LTG 3  B LE 5/5.  -     LTG 3 Progress  Ongoing;Partially Met  -     LTG 4  Circum measurements suprapatellar within 0.5cm of R=L.  -     LTG 4 Progress  Met  -     LTG 5  Patient able to ambulate FWB, non-antalgic, 1/4 mile with no increase in pain.  -     LTG 5 Progress  Partially Met;Ongoing  -     LTG 6  Patient able to ambulate up/down 3 steps reciprocally x10 with no increase in pain.  -     LTG 6 Progress  Met  -     LTG 7  Sit to/from stand x20, no UE A with = WB B LEs.  -     LTG 7 Progress  Partially Met;Progressing  -     LTG 8  I with final HEP and D/C with a free 30 day fitness formula memembership.  -     LTG 8 Progress  Goal Revised  -     LTG 9  Patient able to perform eccentric steps down on 6\" step with " no UE support for 2 minutes  doing at least 75 reps.  -     LTG 9 Progress  New  -     LTG 10  Patient able to ascend and descend a 6 ft ladder for 5 minutes with no increase in pain or fatigue.  -     LTG 10 Progress  New  -        Time Calculation    PT Goal Re-Cert Due Date  05/30/19  -       User Key  (r) = Recorded By, (t) = Taken By, (c) = Cosigned By    Initials Name Provider Type     Emilee Regan PTA Physical Therapy Assistant          Therapy Education  Education Details: 8 inch step ups, bosu step ups,   Given: HEP, Symptoms/condition management, Pain management, Posture/body mechanics  Program: Reinforced  How Provided: Verbal, Demonstration  Provided to: Patient  Level of Understanding: Demonstrated              Time Calculation:   Start Time: 1300  Stop Time: 1420  Time Calculation (min): 80 min  Total Timed Code Minutes- PT: 60 minute(s)  Therapy Charges for Today     Code Description Service Date Service Provider Modifiers Qty    27461233907 HC PT THER PROC EA 15 MIN 5/13/2019 Emilee Regan PTA GP 4    22708291161 HC PT THER SUPP EA 15 MIN 5/13/2019 Emilee Regan PTA GP 1                    Emilee Regan PTA  5/13/2019

## 2019-05-15 ENCOUNTER — HOSPITAL ENCOUNTER (OUTPATIENT)
Dept: PHYSICAL THERAPY | Facility: HOSPITAL | Age: 44
Setting detail: THERAPIES SERIES
Discharge: HOME | End: 2019-05-15

## 2019-05-15 DIAGNOSIS — S76.191D OTHER SPECIFIED INJURY OF RIGHT QUADRICEPS MUSCLE, FASCIA AND TENDON, SUBSEQUENT ENCOUNTER: Primary | ICD-10-CM

## 2019-05-15 DIAGNOSIS — M25.561 ACUTE PAIN OF RIGHT KNEE: ICD-10-CM

## 2019-05-15 PROCEDURE — 97110 THERAPEUTIC EXERCISES: CPT

## 2019-05-15 NOTE — THERAPY TREATMENT NOTE
Outpatient Physical Therapy Ortho Treatment Note  [facility]  Emilee Regan PTA       Patient Name: Alexis Mariee  : 1975  MRN: [id number]  Today's Date: 5/15/2019      Visit Date: 05/15/2019     Visits:   Insurance Visits Approved: 1 hour limitations, no precert  Recert Due: 2019  MD Appt: 2019  Pain: pretreatment 0/10; post treatment 0/10  Improvement: pt is subjectively reporting 75% improvement since initial evaluation    Visit Dx:    ICD-10-CM ICD-9-CM   1. Other specified injury of right quadriceps muscle, fascia and tendon, subsequent encounter S76.191D 959.6   2. Acute pain of right knee M25.561 719.46       Patient Active Problem List   Diagnosis   • Essential hypertension   • Acute pain of right knee        Past Medical History:   Diagnosis Date   • Cellulitis and abscess of leg    • Cellulitis and abscess of trunk    • Cellulitis of trunk    • Deficiency of testosterone biosynthesis    • Encounter for general health examination    • Essential hypertension    • General medical exam    • Pain in lower limb    • Pain in upper limb         Past Surgical History:   Procedure Laterality Date   • BACK SURGERY  2017   • HIP SURGERY      fx left hip socket   • INJECTION OF MEDICATION  2012    Celestone (betamethasone)    • INJECTION OF MEDICATION  2012    Rocephin X 2   • ORIF FOREARM FRACTURE Left    • QUADRICEPS TENDON REPAIR Right 03/10/2019       PT Ortho     Row Name 05/15/19 1000       Subjective Comments    Subjective Comments  denies pain  -       Subjective Pain    Able to rate subjective pain?  yes  -    Pre-Treatment Pain Level  0  -    Post-Treatment Pain Level  0  -MH    Row Name 19 1300       Subjective Comments    Subjective Comments  doing well. denies pain.   -       Subjective Pain    Able to rate subjective pain?  yes  -    Pre-Treatment Pain Level  0  -    Post-Treatment Pain Level  0  -MH      User  Key  (r) = Recorded By, (t) = Taken By, (c) = Cosigned By    Initials Name Provider Type     Emilee Regan PTA Physical Therapy Assistant                      PT Assessment/Plan     Row Name 05/15/19 1000          PT Assessment    Assessment Comments  patient able to hold a 30 second wall sit.   -        PT Plan    PT Frequency  2x/week  -     PT Plan Comments  continue to progress endurance. initiate ice cup to quad tendon next visit  -       User Key  (r) = Recorded By, (t) = Taken By, (c) = Cosigned By    Initials Name Provider Type     Emilee Regan PTA Physical Therapy Assistant            Exercises     Row Name 05/15/19 1000             Subjective Comments    Subjective Comments  denies pain  -         Subjective Pain    Able to rate subjective pain?  yes  -      Pre-Treatment Pain Level  0  -      Post-Treatment Pain Level  0  -         Exercise 1    Exercise Name 1  Treadmill varied incline and speed  -      Time 1  10 minutes  -      Additional Comments  wearing work boots  -         Exercise 2    Exercise Name 2  B St. HS S  -      Reps 2  2  -      Time 2  30 sec hold  -         Exercise 3    Exercise Name 3  Prone Quad S  -      Reps 3  2  -      Time 3  1 minute  -         Exercise 4    Exercise Name 4  Kneeling S  -      Time 4  5 sec hold for increased stretch; 5 minutes  -      Additional Comments  airx foam  -         Exercise 5    Exercise Name 5  positioning with cybex knee extension  -      Additional Comments  has to adjust machine so that she is not so far back for better positioning to decrease pain  -         Exercise 6    Exercise Name 6  stand to 1/2 knee to tall kneel B Lead  -      Reps 6  10  -         Exercise 7    Exercise Name 7  BOSU Fwd Step Up  -      Reps 7  20  -         Exercise 8    Exercise Name 8  BOSU Side Step Up and Over and Back  -      Reps 8  20  -         Exercise 9    Exercise Name 9  Inverted BOSU Mini  "Squats  -      Reps 9  20  -         Exercise 10    Exercise Name 10  Lateral Squat Walk  -      Reps 10  3 laps (in therapy area)  -         Exercise 11    Exercise Name 11  Track walk   -        User Key  (r) = Recorded By, (t) = Taken By, (c) = Cosigned By    Initials Name Provider Type     Emilee Regan PTA Physical Therapy Assistant                       PT OP Goals     Row Name 05/15/19 1000          PT Short Term Goals    STG Date to Achieve  04/22/19  -     STG 1  I with HEP and have additions/changes by next recertification.  -     STG 1 Progress  Met  -     STG 2  AROM R knee 5° from full extension.  -     STG 2 Progress  Met  -     STG 3  AROM R knee flexion >= 90° of flexion.  -     STG 3 Progress  Met  -     STG 4  Patient able to perform 20 SLR with brace with no signficant lag present.  -     STG 4 Progress  Met  -     STG 5  Patient able to verbalize and abide by MD restrictions of no active extension and brace wear.  -     STG 5 Progress  Met  -     STG 6  Patient able to perform eccentric steps down on 6\" step with no UE support for 90 seconds doing at least 50 reps.  -     STG 6 Progress  New  -     STG 6 Progress Comments  .  -     STG 7  Patient able to perform a 30 second wall sit with = WB B LEs.  -     STG 7 Progress  New  -     STG 7 Progress Comments  .  -        Long Term Goals    LTG Date to Achieve  06/14/19  -     LTG 1  AROM R knee 0°.  -     LTG 1 Progress  Met  -     LTG 2  AROM R knee flexion >= 125°.  -     LTG 2 Progress  Met  -     LTG 3  B LE 5/5.  -     LTG 3 Progress  Ongoing;Partially Met  -     LTG 4  Circum measurements suprapatellar within 0.5cm of R=L.  -     LTG 4 Progress  Met  -     LTG 5  Patient able to ambulate FWB, non-antalgic, 1/4 mile with no increase in pain.  -     LTG 5 Progress  Partially Met;Ongoing  -     LTG 6  Patient able to ambulate up/down 3 steps reciprocally x10 with no increase " "in pain.  -     LTG 6 [facility]  Met  [facility]  Sit to/from stand x20, no UE A with = WB B LEs.  -     LTG 7 Progress  Partially Met;Progressing  [facility]  I with final HEP and D/C with a free 30 day fitness formula memembership.  -[facility] 8 Progress  Goal Revised  -[facility] 9  Patient able to perform eccentric steps down on 6\" step with no UE support for 2 minutes  doing at least 75 reps.  -[facility] 9 Progress  New  -[facility] 10  Patient able to ascend and descend a 6 ft ladder for 5 minutes with no increase in pain or fatigue.  -[facility] 10 Progress  New  -        Time Calculation    PT Goal Re-Cert Due Date  05/30/19  -       User Key  (r) = Recorded By, (t) = Taken By, (c) = Cosigned By    Initials Name Provider Type     Emilee Regan PTA Physical Therapy Assistant                         Time Calculation:   Start Time: 1040  Stop Time: 1145  Time Calculation (min): 65 min  Total Timed Code Minutes- PT: 65 minute(s)  Therapy Charges for Today     Code Description Service Date Service Provider Modifiers Qty    77101874460 HC PT THER PROC EA 15 MIN 5/15/2019 Emilee Regan PTA GP 4    22113007567 HC PT THER SUPP EA 15 MIN 5/15/2019 Emilee Regan PTA GP 1                    Emilee Regan PTA  5/15/2019     "

## 2019-05-20 ENCOUNTER — HOSPITAL ENCOUNTER (OUTPATIENT)
Dept: PHYSICAL THERAPY | Facility: HOSPITAL | Age: 44
Setting detail: THERAPIES SERIES
Discharge: HOME | End: 2019-05-20

## 2019-05-20 DIAGNOSIS — M25.561 ACUTE PAIN OF RIGHT KNEE: ICD-10-CM

## 2019-05-20 DIAGNOSIS — S76.191D OTHER SPECIFIED INJURY OF RIGHT QUADRICEPS MUSCLE, FASCIA AND TENDON, SUBSEQUENT ENCOUNTER: Primary | ICD-10-CM

## 2019-05-20 PROCEDURE — 97110 THERAPEUTIC EXERCISES: CPT

## 2019-05-20 NOTE — THERAPY TREATMENT NOTE
Outpatient Physical Therapy Ortho Treatment Note  [facility]  Emilee Regan PTA       Patient Name: Alexis Mariee  : 1975  MRN: [id number]  Today's Date: 2019      Visit Date: 2019    Visits: 15/15  Insurance Visits Approved: 1 hour limitations, no precert  Recert Due: 2019  MD Appt: 2019  Pain: pretreatment 0/10; post treatment 0/10  Improvement: pt is subjectively reporting 75% improvement since initial evaluation           Visit Dx:    ICD-10-CM ICD-9-CM   1. Other specified injury of right quadriceps muscle, fascia and tendon, subsequent encounter S76.191D 959.6   2. Acute pain of right knee M25.561 719.46       Patient Active Problem List   Diagnosis   • Essential hypertension   • Acute pain of right knee        Past Medical History:   Diagnosis Date   • Cellulitis and abscess of leg    • Cellulitis and abscess of trunk    • Cellulitis of trunk    • Deficiency of testosterone biosynthesis    • Encounter for general health examination    • Essential hypertension    • General medical exam    • Pain in lower limb    • Pain in upper limb         Past Surgical History:   Procedure Laterality Date   • BACK SURGERY  2017   • HIP SURGERY      fx left hip socket   • INJECTION OF MEDICATION  2012    Celestone (betamethasone)    • INJECTION OF MEDICATION  2012    Rocephin X 2   • ORIF FOREARM FRACTURE Left    • QUADRICEPS TENDON REPAIR Right 03/10/2019       PT Ortho     Row Name 19 1000       Subjective Comments    Subjective Comments  denies pain. doing ok today  -       Subjective Pain    Able to rate subjective pain?  yes  -    Pre-Treatment Pain Level  0  -      User Key  (r) = Recorded By, (t) = Taken By, (c) = Cosigned By    Initials Name Provider Type     Emilee Regan PTA Physical Therapy Assistant                      PT Assessment/Plan     Row Name 19 1000          PT Assessment    Assessment Comments   patient has hesitancy with ladder still, but once able to redirect focus able to do well with no signifcant difficulty.   -        PT Plan    PT Frequency  2x/week  -     PT Plan Comments  timed ecc step down next visit  -       User Key  (r) = Recorded By, (t) = Taken By, (c) = Cosigned By    Initials Name Provider Type     Emilee Regan PTA Physical Therapy Assistant            Exercises     Row Name 05/20/19 1000             Subjective Comments    Subjective Comments  denies pain. doing ok today  -         Subjective Pain    Able to rate subjective pain?  yes  -      Pre-Treatment Pain Level  0  -      Post-Treatment Pain Level  0  -         Exercise 1    Exercise Name 1  EFX   -      Time 1  15 minutes  -      Additional Comments  resistance 5  -         Exercise 2    Exercise Name 2  B St. HS S  -      Reps 2  2  -      Time 2  30 sec hold  -         Exercise 3    Exercise Name 3  Wall Sits  -      Reps 3  5  -      Time 3  30 sec hold  -         Exercise 4    Exercise Name 4  Single Leg Calf Raise off Step  -      Reps 4  30  -         Exercise 5    Exercise Name 5  LAQ with ER  -      Sets 5  2  -      Reps 5  10  -      Additional Comments  5#  -         Exercise 6    Exercise Name 6  SLR Fwd Flex with ER  -      Sets 6  2  -      Reps 6  10  -      Additional Comments  5#  -         Exercise 7    Exercise Name 7  B Stand to 1/2 kneel to tall kneel  -      Reps 7  10  -         Exercise 8    Exercise Name 8  Crawling  -      Time 8  3 minutes  -      Additional Comments  mat table  -         Exercise 9    Exercise Name 9  Step Ladder  -      Reps 9  10 each  -      Additional Comments  up 2/down 2; up 3/down 3  -        User Key  (r) = Recorded By, (t) = Taken By, (c) = Cosigned By    Initials Name Provider Type     Emilee Regan PTA Physical Therapy Assistant                       PT OP Goals     Row Name 05/20/19 1000          PT  "Short Term Goals    STG Date to Achieve  04/22/19  -     STG 1  I with HEP and have additions/changes by next recertification.  -     STG 1 Progress  Met  -     STG 2  AROM R knee 5° from full extension.  -     STG 2 Progress  Met  -     STG 3  AROM R knee flexion >= 90° of flexion.  -     STG 3 Progress  Met  -     STG 4  Patient able to perform 20 SLR with brace with no signficant lag present.  -     STG 4 Progress  Met  -     STG 5  Patient able to verbalize and abide by MD restrictions of no active extension and brace wear.  -     STG 5 Progress  Met  -     STG 6  Patient able to perform eccentric steps down on 6\" step with no UE support for 90 seconds doing at least 50 reps.  -     STG 6 Progress  New  -     STG 6 Progress Comments  .  -     STG 7  Patient able to perform a 30 second wall sit with = WB B LEs.  -     STG 7 Progress  Met  -     STG 7 Progress Comments  .  -        Long Term Goals    LTG Date to Achieve  06/14/19  -     LTG 1  AROM R knee 0°.  -     LTG 1 Progress  Met  -     LTG 2  AROM R knee flexion >= 125°.  -     LTG 2 Progress  Met  -     LTG 3  B LE 5/5.  -     LTG 3 Progress  Ongoing;Partially Met  -     LTG 4  Circum measurements suprapatellar within 0.5cm of R=L.  -     LTG 4 Progress  Met  -     LTG 5  Patient able to ambulate FWB, non-antalgic, 1/4 mile with no increase in pain.  -     LTG 5 Progress  Partially Met;Ongoing  -     LTG 6  Patient able to ambulate up/down 3 steps reciprocally x10 with no increase in pain.  -     LTG 6 Progress  Met  -     LTG 7  Sit to/from stand x20, no UE A with = WB B LEs.  -     LTG 7 Progress  Partially Met;Progressing  -     LTG 8  I with final HEP and D/C with a free 30 day fitness formula memembership.  -     LTG 8 Progress  Goal Revised  -     LTG 9  Patient able to perform eccentric steps down on 6\" step with no UE support for 2 minutes  doing at least 75 reps.  -     LTG 9 " Progress  Progressing  -     LTG 10  Patient able to ascend and descend a 6 ft ladder for 5 minutes with no increase in pain or fatigue.  -     LTG 10 Progress  Progressing  -        Time Calculation    PT Goal Re-Cert Due Date  05/30/19  -       User Key  (r) = Recorded By, (t) = Taken By, (c) = Cosigned By    Initials Name Provider Type     Emilee Regan PTA Physical Therapy Assistant                         Time Calculation:   Start Time: 1010  Stop Time: 1110  Time Calculation (min): 60 min  Total Timed Code Minutes- PT: 60 minute(s)  Therapy Charges for Today     Code Description Service Date Service Provider Modifiers Qty    42638400534  PT THER PROC EA 15 MIN 5/20/2019 Emilee Regan PTA GP 4    31475147686 HC PT THER SUPP EA 15 MIN 5/20/2019 Emilee Regan PTA GP 1                    Emilee Regan PTA  5/20/2019

## 2019-05-22 ENCOUNTER — HOSPITAL ENCOUNTER (OUTPATIENT)
Dept: PHYSICAL THERAPY | Facility: HOSPITAL | Age: 44
Setting detail: THERAPIES SERIES
Discharge: HOME | End: 2019-05-22

## 2019-05-22 DIAGNOSIS — M25.561 ACUTE PAIN OF RIGHT KNEE: ICD-10-CM

## 2019-05-22 DIAGNOSIS — S76.191D OTHER SPECIFIED INJURY OF RIGHT QUADRICEPS MUSCLE, FASCIA AND TENDON, SUBSEQUENT ENCOUNTER: Primary | ICD-10-CM

## 2019-05-22 PROCEDURE — 97110 THERAPEUTIC EXERCISES: CPT

## 2019-05-22 NOTE — THERAPY TREATMENT NOTE
Outpatient Physical Therapy Ortho Treatment Note  [facility]  Emilee Regan PTA       Patient Name: Alexis Mariee  : 1975  MRN: [id number]  Today's Date: 2019      Visit Date: 2019     Visits:   Insurance Visits Approved: 1 hour limitations, no percerts  Recert Due: 2019  MD Appt: 2019  Pain: pretreatment 0/10; post treatment 0/10  Improvement: pt is subjectively reporting 75% improvement since initial evaluation    Visit Dx:    ICD-10-CM ICD-9-CM   1. Other specified injury of right quadriceps muscle, fascia and tendon, subsequent encounter S76.191D 959.6   2. Acute pain of right knee M25.561 719.46       Patient Active Problem List   Diagnosis   • Essential hypertension   • Acute pain of right knee        Past Medical History:   Diagnosis Date   • Cellulitis and abscess of leg    • Cellulitis and abscess of trunk    • Cellulitis of trunk    • Deficiency of testosterone biosynthesis    • Encounter for general health examination    • Essential hypertension    • General medical exam    • Pain in lower limb    • Pain in upper limb         Past Surgical History:   Procedure Laterality Date   • BACK SURGERY  2017   • HIP SURGERY      fx left hip socket   • INJECTION OF MEDICATION  2012    Celestone (betamethasone)    • INJECTION OF MEDICATION  2012    Rocephin X 2   • ORIF FOREARM FRACTURE Left    • QUADRICEPS TENDON REPAIR Right 03/10/2019       PT Ortho     Row Name 19 1300       Subjective Pain    Able to rate subjective pain?  yes  -    Pre-Treatment Pain Level  0  -    Row Name 19 1000       Subjective Comments    Subjective Comments  denies pain. doing ok today  -       Subjective Pain    Able to rate subjective pain?  yes  -    Pre-Treatment Pain Level  0  -      User Key  (r) = Recorded By, (t) = Taken By, (c) = Cosigned By    Initials Name Provider Type     Emilee Regan PTA Physical Therapy  Assistant                      PT Assessment/Plan     Row Name 05/22/19 1300          PT Plan    PT Frequency  2x/week  -       User Key  (r) = Recorded By, (t) = Taken By, (c) = Cosigned By    Initials Name Provider Type     Emilee Regan PTA Physical Therapy Assistant          Modalities     Row Name 05/22/19 1300             Subjective Comments    Subjective Comments  concerned about his job and thinks he needs to go back soon  -         Subjective Pain    Post-Treatment Pain Level  0  -         Ice    Ice Applied  Yes  -      Location  ice cup massage to quad tendon and medial knee  -      Rx Minutes  Other: 8 minutes  -      Ice S/P Rx  Yes  -        User Key  (r) = Recorded By, (t) = Taken By, (c) = Cosigned By    Initials Name Provider Type    Emilee Mujica PTA Physical Therapy Assistant        Exercises     Row Name 05/22/19 1300             Subjective Comments    Subjective Comments  concerned about his job and thinks he needs to go back soon  -         Subjective Pain    Able to rate subjective pain?  yes  -      Pre-Treatment Pain Level  0  -      Post-Treatment Pain Level  0  -         Exercise 1    Exercise Name 1  Treadmill   -      Time 1  10 minutes  -      Additional Comments  varied incline and resistance  -         Exercise 2    Exercise Name 2  Ecc Step Downs  -MH      Reps 2  77 reps  -      Time 2  2 minutes  -      Additional Comments  6 inch step  -         Exercise 3    Exercise Name 3  Track Walk   -      Reps 3  2 laps  -         Exercise 4    Exercise Name 4  Stool Scoots Single Leg  -      Reps 4  3 laps  -         Exercise 5    Exercise Name 5  Wall Sits  -      Reps 5  5  -MH      Time 5  30 sec hold  -         Exercise 6    Exercise Name 6  LAQ with ER  -      Sets 6  2  -MH      Reps 6  20  -MH      Time 6  5 sec hold  -      Additional Comments  5# weight  -         Exercise 7    Exercise Name 7  SLR with Fwd FLexion  -   "    Sets 7  2  -      Reps 7  20  -      Time 7  5 sec hold  -      Additional Comments  5# cuff weight  -        User Key  (r) = Recorded By, (t) = Taken By, (c) = Cosigned By    Initials Name Provider Type    Emilee Mujica PTA Physical Therapy Assistant                       PT OP Goals     Row Name 05/22/19 1300          PT Short Term Goals    STG Date to Achieve  04/22/19  -     STG 1  I with HEP and have additions/changes by next recertification.  -     STG 1 Progress  Met  -     STG 2  AROM R knee 5° from full extension.  -     STG 2 Progress  Met  -     STG 3  AROM R knee flexion >= 90° of flexion.  -     STG 3 Progress  Met  -     STG 4  Patient able to perform 20 SLR with brace with no signficant lag present.  -     STG 4 Progress  Met  [facility]     STG 5  Patient able to verbalize and abide by MD restrictions of no active extension and brace wear.  -     STG 5 Progress  Met  [facility]     STG 6  Patient able to perform eccentric steps down on 6\" step with no UE support for 90 seconds doing at least 50 reps.  -     STG 6 Progress  Met  -     STG 6 Progress Comments  .  -     STG 7  Patient able to perform a 30 second wall sit with = WB B LEs.  -     STG 7 Progress  Met  -     STG 7 Progress Comments  .  -        Long Term Goals    LTG Date to Achieve  06/14/19  -     LTG 1  AROM R knee 0°.  -     LTG 1 Progress  Met  -     LTG 2  AROM R knee flexion >= 125°.  -     LTG 2 Progress  Met  [facility]     LTG 3  B LE 5/5.  -     LTG 3 Progress  Ongoing;Partially Met  -     LTG 4  Circum measurements suprapatellar within 0.5cm of R=L.  -     LTG 4 Progress  Met  -     LTG 5  Patient able to ambulate FWB, non-antalgic, 1/4 mile with no increase in pain.  -     LTG 5 Progress  Partially Met;Ongoing  -     LTG 6  Patient able to ambulate up/down 3 steps reciprocally x10 with no increase in pain.  -     LTG 6 Progress  Met  -     LTG 7  Sit to/from stand x20, no UE A " "with = WB B LEs.  -     LTG 7 Progress  Partially Met;Progressing  -     LTG 8  I with final HEP and D/C with a free 30 day fitness formula [employer].  -     LTG 8 Progress  Goal Revised  -     LTG 9  Patient able to perform eccentric steps down on 6\" step with no UE support for 2 minutes  doing at least 75 reps.  -     LTG 9 Progress  Met  -     LTG 10  Patient able to ascend and descend a 6 ft ladder for 5 minutes with no increase in pain or fatigue.  -     LTG 10 Progress  Progressing  -        Time Calculation    PT Goal Re-Cert Due Date  05/30/19  -       User Key  (r) = Recorded By, (t) = Taken By, (c) = Cosigned By    Initials Name Provider Type    Emilee Mujica PTA Physical Therapy Assistant          Therapy Education  Education Details: LAQ with ER, SLR fwd with ER  Given: HEP, Symptoms/condition management, Pain management, Posture/body mechanics  Program: Reinforced  How Provided: Verbal, Demonstration  Provided to: Patient  Level of Understanding: Teach back education performed, Verbalized, Demonstrated              Time Calculation:   Start Time: 1300  Stop Time: 1410  Time Calculation (min): 70 min  PT Non-Billable Time (min): 8 min  Total Timed Code Minutes- PT: 62 minute(s)  Therapy Charges for Today     Code Description Service Date Service Provider Modifiers Qty    11501309349 HC PT THER PROC EA 15 MIN 5/22/2019 Emilee Regan PTA GP 4    35131835873 HC PT THER SUPP EA 15 MIN 5/22/2019 Emilee Regan PTA GP 1                    Emilee Regan PTA  5/22/2019     "

## 2019-05-28 ENCOUNTER — HOSPITAL ENCOUNTER (OUTPATIENT)
Dept: PHYSICAL THERAPY | Facility: HOSPITAL | Age: 44
Setting detail: THERAPIES SERIES
Discharge: HOME | End: 2019-05-28

## 2019-05-28 DIAGNOSIS — M25.561 ACUTE PAIN OF RIGHT KNEE: ICD-10-CM

## 2019-05-28 DIAGNOSIS — S76.191D OTHER SPECIFIED INJURY OF RIGHT QUADRICEPS MUSCLE, FASCIA AND TENDON, SUBSEQUENT ENCOUNTER: Primary | ICD-10-CM

## 2019-05-28 PROCEDURE — 97110 THERAPEUTIC EXERCISES: CPT

## 2019-05-28 NOTE — THERAPY TREATMENT NOTE
Outpatient Physical Therapy Ortho Treatment Note  [facility]  Emilee Regan PTA       Patient Name: Alexis Mariee  : 1975  MRN: [id number]  Today's Date: 2019      Visit Date: 2019     Visits:    Insurance Visits Approved: no precert  Recert Due: 2019  MD Appt: 2019  Pain: pretreatment 0/10; post treatment 0/10  Improvement: pt is subjectively reporting 75% improvement since initial evaluation    Visit Dx:    ICD-10-CM ICD-9-CM   1. Other specified injury of right quadriceps muscle, fascia and tendon, subsequent encounter S76.191D 959.6   2. Acute pain of right knee M25.561 719.46       Patient Active Problem List   Diagnosis   • Essential hypertension   • Acute pain of right knee        Past Medical History:   Diagnosis Date   • Cellulitis and abscess of leg    • Cellulitis and abscess of trunk    • Cellulitis of trunk    • Deficiency of testosterone biosynthesis    • Encounter for general health examination    • Essential hypertension    • General medical exam    • Pain in lower limb    • Pain in upper limb         Past Surgical History:   Procedure Laterality Date   • BACK SURGERY  2017   • HIP SURGERY      fx left hip socket   • INJECTION OF MEDICATION  2012    Celestone (betamethasone)    • INJECTION OF MEDICATION  2012    Rocephin X 2   • ORIF FOREARM FRACTURE Left    • QUADRICEPS TENDON REPAIR Right 03/10/2019       PT Ortho     Row Name 19 1300       Subjective Pain    Able to rate subjective pain?  yes  -    Pre-Treatment Pain Level  0  -    Post-Treatment Pain Level  0  -      User Key  (r) = Recorded By, (t) = Taken By, (c) = Cosigned By    Initials Name Provider Type    Emilee Mujica PTA Physical Therapy Assistant                      PT Assessment/Plan     Row Name 19 1300          PT Assessment    Assessment Comments  patient is ascending and descending 6 foot step ladder with improved ability  but still fatigues and has pain. good effort throughout. encouraged patient to focus on VMO strengthening at home.   -        PT Plan    PT Frequency  2x/week  -     PT Plan Comments  next visit continue step ladder.   -       User Key  (r) = Recorded By, (t) = Taken By, (c) = Cosigned By    Initials Name Provider Type    Emilee Mujica PTA Physical Therapy Assistant          Modalities     Row Name 05/28/19 1300             Ice    Ice Applied  Yes  -      Location  ice cup massage to quad tendon and medial knee  -      Rx Minutes  Other: 8 minutes  -      Ice S/P Rx  Yes  -        User Key  (r) = Recorded By, (t) = Taken By, (c) = Cosigned By    Initials Name Provider Type    Emilee Mujica PTA Physical Therapy Assistant        Exercises     Row Name 05/28/19 1300             Subjective Comments    Subjective Comments  [place].  sees the doctor on monday not tuesday next week.   -         Subjective Pain    Able to rate subjective pain?  yes  -      Pre-Treatment Pain Level  0  -      Post-Treatment Pain Level  0  -         Exercise 1    Exercise Name 1  Treadmill  -      Time 1  10 minutes  -      Additional Comments  varied speed and incline  -         Exercise 2    Exercise Name 2  Wall Sits  -      Reps 2  6  -      Time 2  30 sec hold  -         Exercise 3    Exercise Name 3  Climb Step Ladder  -      Time 3  5 minutes  -      Additional Comments  c/o pain  -         Exercise 4    Exercise Name 4  LAQ with ER  -      Sets 4  3  -      Reps 4  15  -      Additional Comments  5# weight  -         Exercise 5    Exercise Name 5  SLR Fwd Flex with ER  -      Reps 5  10  -         Exercise 6    Exercise Name 6  Cable Column 4 way walkouts  -      Reps 6  10 each  -      Additional Comments  10 plates except Lat (8 plates)  -         Exercise 7    Exercise Name 7  Crawling  -       "Time 7  3 minutes  -        User Key  (r) = Recorded By, (t) = Taken By, (c) = Cosigned By    Initials Name Provider Type    Emilee Mujica, PTA Physical Therapy Assistant                       PT OP Goals     Row Name 05/28/19 1300          PT Short Term Goals    STG Date to Achieve  04/22/19  -     STG 1  I with HEP and have additions/changes by next recertification.  -     STG 1 Progress  Met  -     STG 2  AROM R knee 5° from full extension.  -     STG 2 Progress  Met  -     STG 3  AROM R knee flexion >= 90° of flexion.  -     STG 3 Progress  Met  -     STG 4  Patient able to perform 20 SLR with brace with no signficant lag present.  -     STG 4 Progress  Met  -     STG 5  Patient able to verbalize and abide by MD restrictions of no active extension and brace wear.  -     STG 5 Progress  Met  -     STG 6  Patient able to perform eccentric steps down on 6\" step with no UE support for 90 seconds doing at least 50 reps.  -     STG 6 Progress  Met  -     STG 6 Progress Comments  .  -     STG 7  Patient able to perform a 30 second wall sit with = WB B LEs.  -     STG 7 Progress  Met  -     STG 7 Progress Comments  .  -        Long Term Goals    LTG Date to Achieve  06/14/19  -     LTG 1  AROM R knee 0°.  -     LTG 1 Progress  Met  -     LTG 2  AROM R knee flexion >= 125°.  -     LTG 2 Progress  Met  -     LTG 3  B LE 5/5.  -     LTG 3 Progress  Ongoing;Partially Met  -     LTG 4  Circum measurements suprapatellar within 0.5cm of R=L.  -     LTG 4 Progress  Met  -     LTG 5  Patient able to ambulate FWB, non-antalgic, 1/4 mile with no increase in pain.  -     LTG 5 Progress  Partially Met;Ongoing  -     LTG 6  Patient able to ambulate up/down 3 steps reciprocally x10 with no increase in pain.  -     LTG 6 Progress  Met  -     LTG 7  Sit to/from stand x20, no UE A with = WB B LEs.  -     LTG 7 Progress  Partially Met;Progressing  -     LTG 8  I with " "final HEP and D[facility]C [facility]  -     LTG 8 Progress  Goal Revised  -     LTG 9  Patient able to perform eccentric steps down on 6\" step with no UE support for 2 minutes  doing at least 75 reps.  -     LTG 9 Progress  Met  -     LTG 10  Patient able to ascend and descend a 6 ft ladder for 5 minutes with no increase in pain or fatigue.  -     LTG 10 Progress  Partially Met  -        Time Calculation    PT Goal Re-Cert Due Date  05/30/19  -       User Key  (r) = Recorded By, (t) = Taken By, (c) = Cosigned By    Initials Name Provider Type     Emilee Regan PTA Physical Therapy Assistant                         Time Calculation:   Start Time: 1300  Stop Time: 1406  Time Calculation (min): 66 min  PT Non-Billable Time (min): 8 min  Total Timed Code Minutes- PT: 58 minute(s)  Therapy Charges for Today     Code Description Service Date Service Provider Modifiers Qty    59805062210 HC PT THER PROC EA 15 MIN 5/28/2019 Emilee Regan PTA GP 4    18189351323 HC PT THER SUPP EA 15 MIN 5/28/2019 Emilee Regan PTA GP 1                    Emilee Regan PTA  5/28/2019     "

## 2019-05-30 ENCOUNTER — APPOINTMENT (OUTPATIENT)
Dept: PHYSICAL THERAPY | Facility: HOSPITAL | Age: 44
End: 2019-05-30

## 2019-05-31 ENCOUNTER — HOSPITAL ENCOUNTER (OUTPATIENT)
Dept: PHYSICAL THERAPY | Facility: HOSPITAL | Age: 44
Setting detail: THERAPIES SERIES
Discharge: HOME | End: 2019-05-31

## 2019-05-31 DIAGNOSIS — M25.561 ACUTE PAIN OF RIGHT KNEE: ICD-10-CM

## 2019-05-31 DIAGNOSIS — S76.191D OTHER SPECIFIED INJURY OF RIGHT QUADRICEPS MUSCLE, FASCIA AND TENDON, SUBSEQUENT ENCOUNTER: Primary | ICD-10-CM

## 2019-05-31 PROCEDURE — 97110 THERAPEUTIC EXERCISES: CPT | Performed by: PHYSICAL THERAPIST

## 2019-06-04 ENCOUNTER — APPOINTMENT (OUTPATIENT)
Dept: PHYSICAL THERAPY | Facility: HOSPITAL | Age: 44
End: 2019-06-04

## 2019-06-05 ENCOUNTER — HOSPITAL ENCOUNTER (OUTPATIENT)
Dept: PHYSICAL THERAPY | Facility: HOSPITAL | Age: 44
Setting detail: THERAPIES SERIES
Discharge: HOME | End: 2019-06-05

## 2019-06-05 DIAGNOSIS — S76.191D OTHER SPECIFIED INJURY OF RIGHT QUADRICEPS MUSCLE, FASCIA AND TENDON, SUBSEQUENT ENCOUNTER: Primary | ICD-10-CM

## 2019-06-05 DIAGNOSIS — M25.561 ACUTE PAIN OF RIGHT KNEE: ICD-10-CM

## 2019-06-05 PROCEDURE — 97110 THERAPEUTIC EXERCISES: CPT

## 2019-06-05 NOTE — THERAPY TREATMENT NOTE
Outpatient Physical Therapy Ortho Treatment Note  [facility]  Emilee Regan PTA       Patient Name: Alexis Mariee  : 1975  MRN: [id number]  Today's Date: 2019      Visit Date: 2019     Visits:   Insurance Visits Approved: no precert; 1 hour per day  Recert Due: N/A  MD Appt: 2019  Pain: pretreatment 0/10; post treatment 0/10  Improvement: pt is subjectively reporting 90% improvement since initial evaluation    Visit Dx:    ICD-10-CM ICD-9-CM   1. Other specified injury of right quadriceps muscle, fascia and tendon, subsequent encounter S76.191D 959.6   2. Acute pain of right knee M25.561 719.46       Patient Active Problem List   Diagnosis   • Essential hypertension   • Acute pain of right knee        Past Medical History:   Diagnosis Date   • Cellulitis and abscess of leg    • Cellulitis and abscess of trunk    • Cellulitis of trunk    • Deficiency of testosterone biosynthesis    • Encounter for general health examination    • Essential hypertension    • General medical exam    • Pain in lower limb    • Pain in upper limb         Past Surgical History:   Procedure Laterality Date   • BACK SURGERY  2017   • HIP SURGERY      fx left hip socket   • INJECTION OF MEDICATION  2012    Celestone (betamethasone)    • INJECTION OF MEDICATION  2012    Rocephin X 2   • ORIF FOREARM FRACTURE Left    • QUADRICEPS TENDON REPAIR Right 03/10/2019       PT Ortho     Row Name 19 1300       Subjective Pain    Able to rate subjective pain?  yes  -    Post-Treatment Pain Level  0  -      User Key  (r) = Recorded By, (t) = Taken By, (c) = Cosigned By    Initials Name Provider Type    Emilee Mujica PTA Physical Therapy Assistant                      PT Assessment/Plan     Row Name 19 1300          PT Assessment    Assessment Comments  patient has no difficulty with therex this treatment. will review final HEP one more visit   -NIKO         PT Plan    PT Frequency  2x/week  -     PT Plan Comments  discharge next visit per primary POC  -       User Key  (r) = Recorded By, (t) = Taken By, (c) = Cosigned By    Initials Name Provider Type    NIKO Regan Emilee RAMONJOCELYNN Physical Therapy Assistant          Modalities     Row Name 06/05/19 1300             Subjective Comments    Subjective Comments  reports that he saw the doctor. he is off work now until july still. feels better about not rushing back.   -         Ice    Patient denies application of Ice  Yes  -      Patient reports will apply ice at home to involved area  Yes  -        User Key  (r) = Recorded By, (t) = Taken By, (c) = Cosigned By    Initials Name Provider Type    NIKO Candidohasmukh Emilee NAVARRO PTA Physical Therapy Assistant        Exercises     Row Name 06/05/19 1300             Subjective Comments    Subjective Comments  reports that he saw the doctor. he is off work now until july still. feels better about not rushing back.   -         Subjective Pain    Able to rate subjective pain?  yes  -      Pre-Treatment Pain Level  0  -      Post-Treatment Pain Level  0  -         Exercise 1    Exercise Name 1  EFX  -MH      Time 1  10 minutes  -      Additional Comments  varied speed and incline/resistance  -         Exercise 2    Exercise Name 2  B St HS S  -MH      Reps 2  2  -MH      Time 2  30 sec hold  -MH         Exercise 3    Exercise Name 3  Calf S  -MH      Reps 3  2  -MH      Time 3  30 sec hold  -MH         Exercise 4    Exercise Name 4  Wall Sits  -MH      Reps 4  6  -MH      Time 4  30 sec hold  -MH         Exercise 5    Exercise Name 5  LAQ with ER  -MH      Sets 5  3  -MH      Reps 5  15  -MH      Time 5  5 sec hold  -MH      Additional Comments  5#  -         Exercise 6    Exercise Name 6  R SLR fwd with ER  -MH      Sets 6  3  -MH      Reps 6  15  -MH      Time 6  5 sec hold  -MH      Additional Comments  5# weight  -MH         Exercise 7    Exercise Name 7  CC Walk Outs   "-      Reps 7  10 each  -      Additional Comments  10 plates  -        User Key  (r) = Recorded By, (t) = Taken By, (c) = Cosigned By    Initials Name Provider Type    Emilee Mujica PTA Physical Therapy Assistant                       PT OP Goals     Row Name 06/05/19 1300          PT Short Term Goals    STG Date to Achieve  04/22/19  -     STG 1  I with HEP and have additions/changes by next recertification.  -     STG 1 Progress  Met  -     STG 2  AROM R knee 5° from full extension.  -     STG 2 Progress  Met  -     STG 3  AROM R knee flexion >= 90° of flexion.  -     STG 3 Progress  Met  -     STG 4  Patient able to perform 20 SLR with brace with no signficant lag present.  -     STG 4 Progress  Met  [facility]     STG 5  Patient able to verbalize and abide by MD restrictions of no active extension and brace wear.  -     STG 5 Progress  Met  [facility]     STG 6  Patient able to perform eccentric steps down on 6\" step with no UE support for 90 seconds doing at least 50 reps.  -     STG 6 Progress  Met  -     STG 6 Progress Comments  .  -     STG 7  Patient able to perform a 30 second wall sit with = WB B LEs.  -     STG 7 Progress  Met  -     STG 7 Progress Comments  .  -        Long Term Goals    LTG Date to Achieve  06/14/19  -     LTG 1  AROM R knee 0°.  -     LTG 1 Progress  Met  [facility]     LTG 2  AROM R knee flexion >= 125°.  -     LTG 2 Progress  Met  [facility]     LTG 3  B LE 5/5.  -     LTG 3 Progress  Partially Met  [facility]     LTG 4  Circum measurements suprapatellar within 0.5cm of R=L.  -     LTG 4 Progress  Met  [facility]     LTG 5  Patient able to ambulate FWB, non-antalgic, 1/4 mile with no increase in pain.  -     LTG 5 Progress  Met  [facility]     LTG 6  Patient able to ambulate up/down 3 steps reciprocally x10 with no increase in pain.  -     LTG 6 Progress  Met  [facility]     LTG 7  Sit to/from stand x20, no UE A with = WB B LEs.  -     LTG 7 Progress  Met  [facility]     LTG 8  I with " "final HEP and D/C with a free 30 day fitness formula [employer].  -     LTG 8 Progress  Goal Revised  -     LTG 9  Patient able to perform eccentric steps down on 6\" step with no UE support for 2 minutes  doing at least 75 reps.  -     LTG 9 Progress  Met  -     LTG 10  Patient able to ascend and descend a 6 ft ladder for 5 minutes with no increase in pain or fatigue.  -     LTG 10 Progress  Met  -        Time Calculation    PT Goal Re-Cert Due Date  -- N/A  -       User Key  (r) = Recorded By, (t) = Taken By, (c) = Cosigned By    Initials Name Provider Type     Emilee Regan PTA Physical Therapy Assistant                         Time Calculation:   Start Time: 1345  Stop Time: 1449  Time Calculation (min): 64 min  Total Timed Code Minutes- PT: 64 minute(s)  Therapy Charges for Today     Code Description Service Date Service Provider Modifiers Qty    34176490969 HC PT THER PROC EA 15 MIN 6/5/2019 Emilee Regan PTA GP 4    54023964624 HC PT THER SUPP EA 15 MIN 6/5/2019 Emilee Regan PTA GP 1                    Emilee Regan PTA  6/5/2019     "

## 2019-06-06 ENCOUNTER — HOSPITAL ENCOUNTER (OUTPATIENT)
Dept: PHYSICAL THERAPY | Facility: HOSPITAL | Age: 44
Setting detail: THERAPIES SERIES
Discharge: HOME | End: 2019-06-06

## 2019-06-06 DIAGNOSIS — M25.561 ACUTE PAIN OF RIGHT KNEE: ICD-10-CM

## 2019-06-06 DIAGNOSIS — S76.191D OTHER SPECIFIED INJURY OF RIGHT QUADRICEPS MUSCLE, FASCIA AND TENDON, SUBSEQUENT ENCOUNTER: Primary | ICD-10-CM

## 2019-06-06 PROCEDURE — 97110 THERAPEUTIC EXERCISES: CPT

## 2019-06-06 NOTE — THERAPY DISCHARGE NOTE
Outpatient Physical Therapy Ortho Treatment Note/Discharge Summary  [facility]  Emilee Reagn PTA       Patient Name: Alexis Mariee  : 1975  MRN: [id number]  Today's Date: 2019      Visit Date: 2019     Visits:   Insurance Visits Approved: no precert; 1 hour per day  Recert Due: N/A  MD Appt: 2019  Pain: pretreatment 0/10; post treatment 0/10  Improvement: pt is subjectively reporting 90% improvement since initial evaluation    Visit Dx:    ICD-10-CM ICD-9-CM   1. Other specified injury of right quadriceps muscle, fascia and tendon, subsequent encounter S76.191D 959.6   2. Acute pain of right knee M25.561 719.46       Patient Active Problem List   Diagnosis   • Essential hypertension   • Acute pain of right knee        Past Medical History:   Diagnosis Date   • Cellulitis and abscess of leg    • Cellulitis and abscess of trunk    • Cellulitis of trunk    • Deficiency of testosterone biosynthesis    • Encounter for general health examination    • Essential hypertension    • General medical exam    • Pain in lower limb    • Pain in upper limb         Past Surgical History:   Procedure Laterality Date   • BACK SURGERY  2017   • HIP SURGERY      fx left hip socket   • INJECTION OF MEDICATION  2012    Celestone (betamethasone)    • INJECTION OF MEDICATION  2012    Rocephin X 2   • ORIF FOREARM FRACTURE Left    • QUADRICEPS TENDON REPAIR Right 03/10/2019       PT Ortho     Row Name 19 1300       Subjective Pain    Able to rate subjective pain?  yes  -    Pre-Treatment Pain Level  0  -MH    Post-Treatment Pain Level  0  -    Row Name 19 1300       Subjective Pain    Able to rate subjective pain?  yes  -    Post-Treatment Pain Level  0  -MH      User Key  (r) = Recorded By, (t) = Taken By, (c) = Cosigned By    Initials Name Provider Type    Emilee Mujica PTA Physical Therapy Assistant                      PT  Assessment/Plan     Row Name 06/06/19 1300          PT Assessment    Assessment Comments  patient has overall improved function and strength. good effort   -        PT Plan    PT Frequency  2x/week  -     PT Plan Comments  discharge to independent management with free 30 days fitness membership  -       User Key  (r) = Recorded By, (t) = Taken By, (c) = Cosigned By    Initials Name Provider Type     Emilee Regan PTA Physical Therapy Assistant          Modalities     Row Name 06/06/19 1300             Ice    Ice Applied  Yes  -      Rx Minutes  15 mins  -      Ice S/P Rx  Yes  -        User Key  (r) = Recorded By, (t) = Taken By, (c) = Cosigned By    Initials Name Provider Type     Emilee Regan PTA Physical Therapy Assistant          Exercises     Row Name 06/06/19 1300             Subjective Comments    Subjective Comments  did leg extension at the other gym. able to get up 90#  -         Subjective Pain    Able to rate subjective pain?  yes  -      Pre-Treatment Pain Level  0  -      Post-Treatment Pain Level  0  -         Exercise 1    Exercise Name 1  Treadmill  -      Time 1  10 minutes  -      Additional Comments  varied speed and incline  -         Exercise 2    Exercise Name 2  B St. HS S  -MH      Reps 2  2  -MH      Time 2  30 sec hold  -         Exercise 3    Exercise Name 3  Cybex Leg Extension  -      Sets 3  2  -MH      Reps 3  10  -MH      Additional Comments  4 plates  -         Exercise 4    Exercise Name 4  SLR Fwd flex ER  -      Sets 4  3  -MH      Reps 4  15  -MH      Additional Comments  5# weights  -         Exercise 5    Exercise Name 5  SLR ADD  -MH      Sets 5  3  -MH      Reps 5  15  -MH      Additional Comments  5# weight  -MH         Exercise 6    Exercise Name 6  Wall Sit  -      Time 6  1 minute 18 seconds  -         Exercise 7    Exercise Name 7  Track Walk  -      Reps 7  3 laps  -         Exercise 8    Exercise Name 8  Stool  "Scoots Single Leg  -      Reps 8  3 laps  -         Exercise 9    Exercise Name 9  Wall Sit  -      Time 9  2 minutes  -        User Key  (r) = Recorded By, (t) = Taken By, (c) = Cosigned By    Initials Name Provider Type    Emilee Mujica, PTA Physical Therapy Assistant                         PT OP Goals     Row Name 06/06/19 1300          PT Short Term Goals    STG Date to Achieve  04/22/19  -     STG 1  I with HEP and have additions/changes by next recertification.  -     STG 1 Progress  Met  -     STG 2  AROM R knee 5° from full extension.  -     STG 2 Progress  Met  [facility]     STG 3  AROM R knee flexion >= 90° of flexion.  -     STG 3 Progress  Met  [facility]     STG 4  Patient able to perform 20 SLR with brace with no signficant lag present.  -     STG 4 Progress  Met  [facility]     STG 5  Patient able to verbalize and abide by MD restrictions of no active extension and brace wear.  -     STG 5 Progress  Met  [facility]     STG 6  Patient able to perform eccentric steps down on 6\" step with no UE support for 90 seconds doing at least 50 reps.  -     STG 6 Progress  Met  [facility]     STG 6 Progress Comments  .  -     STG 7  Patient able to perform a 30 second wall sit with = WB B LEs.  -     STG 7 Progress  Met  -     STG 7 Progress Comments  .  -        Long Term Goals    LTG Date to Achieve  06/14/19  -     LTG 1  AROM R knee 0°.  -     LTG 1 Progress  Met  [facility]     LTG 2  AROM R knee flexion >= 125°.  -     LTG 2 Progress  Met  [facility]     LTG 3  B LE 5/5.  -     LTG 3 Progress  Met  [facility]     LTG 4  Circum measurements suprapatellar within 0.5cm of R=L.  -     LTG 4 Progress  Met  -     LTG 5  Patient able to ambulate FWB, non-antalgic, 1/4 mile with no increase in pain.  -     LTG 5 Progress  Met  [facility]     LTG 6  Patient able to ambulate up/down 3 steps reciprocally x10 with no increase in pain.  -     LTG 6 Progress  Met  [facility]     LTG 7  Sit to/from stand x20, no UE A with = WB B LEs.  - " "    LTG 7 Progress  Met  -     LTG 8  I with final HEP and D/C with a free 30 day fitness formula [facility].  -     LTG 8 Progress  Met  -     LTG 9  Patient able to perform eccentric steps down on 6\" step with no UE support for 2 minutes  doing at least 75 reps.  [facility]  Patient able to ascend and descend a 6 ft ladder for 5 minutes with no increase in pain or fatigue.  -     LTG 10 Progress  Met  -        Time Calculation    PT Goal Re-Cert Due Date  -- N/A  -       User Key  (r) = Recorded By, (t) = Taken By, (c) = Cosigned By    Initials Name Provider Type     Emilee Regan PTA Physical Therapy Assistant                         Time Calculation:   Start Time: 1300  Stop Time: 1410  Time Calculation (min): 70 min  PT Non-Billable Time (min): 15 min  Total Timed Code Minutes- PT: 55 minute(s)  Therapy Charges for Today     Code Description Service Date Service Provider Modifiers Qty    22001133953 HC PT THER SUPP EA 15 MIN 6/6/2019 Emilee Regan PTA GP 1    68461577694 HC PT THER PROC EA 15 MIN 6/6/2019 Emilee Regan PTA GP 4                OP PT Discharge Summary  Date of Discharge: 06/06/19  Reason for Discharge: All goals achieved  Outcomes Achieved: Able to achieve all goals within established timeline  Discharge Destination: Home with home program  Discharge Instructions/Additional Comments: free 30 days fitness membership      Emilee Regan PTA  6/6/2019       "

## 2025-02-23 NOTE — PROGRESS NOTES
Alexis Mariee is a 43 y.o. male   Primary provider:  Estela Beasley APRN       Chief Complaint   Patient presents with   • Right Knee - Pain, Edema       HISTORY OF PRESENT ILLNESS:DOI 03/02/2019  Pt. States he fell on wet floor and hyperextended Rt. Knee .  Pt. Brought CD with him.    This is the first office visit for evaluation of right knee pain.    Mr. Mariee is 43 years old.  He said he was in his garage days ago when he slipped with extension of the left knee and a fall on the medial aspect of the flexed right knee.  He had prompt onset of pain in the right knee.  He said it felt like he ripped a muscle.  He denies any previous problems with the knee.  Complains of weakness and inability to bear weight on the right and he had prompt swelling.  He has had no popping.  He has been on crutches or a walker since injury.  X-rays were performed yesterday.  He was told these were normal.  Past medical history is remarkable for testosterone deficiency and hypertension.  Current medications include testosterone and Norvasc among others.  Allergic to penicillin.  He is employed as a .  He says he is .  He said there is no light duty work available for him.  Volunteers that the day before his injury he was exercising in the gym and was squatting with 550 pounds.    Knee Pain    The incident occurred 3 to 5 days ago. The injury mechanism was a fall. The pain is present in the right knee. The quality of the pain is described as stabbing. The pain is at a severity of 5/10. The pain is severe. The pain has been improving since onset. The symptoms are aggravated by weight bearing (driving). He has tried nothing for the symptoms. The treatment provided no relief.        CONCURRENT MEDICAL HISTORY:    Past Medical History:   Diagnosis Date   • Cellulitis and abscess of leg    • Cellulitis and abscess of trunk    • Cellulitis of trunk    • Deficiency of testosterone biosynthesis    • Encounter  "for general health examination    • Essential hypertension    • General medical exam    • Pain in lower limb    • Pain in upper limb        Allergies   Allergen Reactions   • Penicillins          Current Outpatient Medications:   •  amLODIPine (NORVASC) 10 MG tablet, TAKE 1 TABLET DAILY., Disp: 30 tablet, Rfl: 0  •  losartan (COZAAR) 100 MG tablet, Take 1 tablet by mouth Daily., Disp: 30 tablet, Rfl: 5  •  Testosterone Cypionate 200 MG/ML kit, Inject 1 mL into the shoulder, thigh, or buttocks Every 14 (Fourteen) Days., Disp: , Rfl:     Past Surgical History:   Procedure Laterality Date   • BACK SURGERY  02/2017   • HIP SURGERY  2007    fx left hip socket   • INJECTION OF MEDICATION  03/06/2012    Celestone (betamethasone)    • INJECTION OF MEDICATION  03/22/2012    Rocephin X 2   • ORIF FOREARM FRACTURE Left 2007       Family History   Problem Relation Age of Onset   • Hypertension Father    • Heart disease Paternal Grandfather        Social History     Socioeconomic History   • Marital status: Single     Spouse name: Not on file   • Number of children: Not on file   • Years of education: Not on file   • Highest education level: Not on file   Social Needs   • Financial resource strain: Not on file   • Food insecurity - worry: Not on file   • Food insecurity - inability: Not on file   • Transportation needs - medical: Not on file   • Transportation needs - non-medical: Not on file   Occupational History   • Not on file   Tobacco Use   • Smoking status: Never Smoker   • Smokeless tobacco: Never Used   Substance and Sexual Activity   • Alcohol use: No     Frequency: Never   • Drug use: Defer   • Sexual activity: Defer   Other Topics Concern   • Not on file   Social History Narrative   • Not on file        Review of Systems   All other systems reviewed and are negative.      PHYSICAL EXAMINATION:       Ht 175.3 cm (69\")   Wt 125 kg (276 lb)   BMI 40.76 kg/m²     Physical Exam exam shows he is alert and apparent mild " discomfort.  He responds appropriately to questions and commands.  He has a somewhat worried affect.    GAIT:     []  Normal  [x]  Antalgic    Assistive device: []  None  [x]  Walker     []  Crutches  []  Cane     []  Wheelchair  []  Stretcher    Ortho Exam exam is directed to the lower extremities.  Walks with considerable difficulty touchdown weightbearing on the right.  In the seated position the knee is flexed to 90 degrees.  He says he is unable to actively extend the knee.  However passive extension of the knee is to about 10-15 degrees of flexion with little pain.  There is an area of ecchymosis 5 x 3 cm over the medial aspect of the knee just posterior to the patella.  There is also a small area of ecchymosis 2 cm in diameter lateral to the patella.  Patellar manipulation produces no crepitus and mild pain.  There is a palpable defect in the distal quadriceps tendon.  Is no instability in varus and valgus stress.  Because of his pain and apprehension I was unable to perform a meaningful Lachman exam.  Sensory exam was intact to soft touch.  There was no regional adenopathy.  There was no muscular atrophy in the thigh.  All 4 extremities are very muscular.    Radiograph's of the knee done yesterday were reviewed.  There are several small flecks of bone just proximal to the patella on the lateral view.  There is tilting of the patella consistent with quadriceps rupture.          No results found.        ASSESSMENT: Impression 1 acute rupture right quadriceps.  2 hypertension.  3 anabolic steroid use.  4 obesity.    The natural history of the disorder and treatment options were reviewed.  Understands that even with surgical treatment he should not expect to have returned to completely normal function.  Nonoperative treatment would predictably result in poor function.  I think that repair of the rupture is appropriate.  I have recommended obtaining an MRI scan to rule out associated pathology.    He was  instructed in activity restriction.  Was given Ace wraps for soft support and also given a knee immobilizer.  He may begin weightbearing as tolerated with his knee immobilizer in place.      Return here in 9 days for review of his MRI scan and possible scheduling of surgery.    Diagnoses and all orders for this visit:    Acute pain of right knee  -     Cancel: MRI Knee Right Without Contrast; Future  -     MRI Knee Right Without Contrast; Future    Quadriceps muscle rupture, right, initial encounter          PLAN    Return in about 9 days (around 3/14/2019).    Chidi Sheppard MD     N/A Patient is under age 18 and does not have a history of high risk behavior or is not high risk for Hep C